# Patient Record
Sex: MALE | Race: WHITE | Employment: FULL TIME | URBAN - METROPOLITAN AREA
[De-identification: names, ages, dates, MRNs, and addresses within clinical notes are randomized per-mention and may not be internally consistent; named-entity substitution may affect disease eponyms.]

---

## 2018-11-14 ENCOUNTER — HOSPITAL ENCOUNTER (OUTPATIENT)
Dept: GENERAL RADIOLOGY | Age: 53
Discharge: HOME | End: 2018-11-14
Payer: COMMERCIAL

## 2018-11-14 ENCOUNTER — HOSPITAL ENCOUNTER (OUTPATIENT)
Age: 53
Discharge: HOME | End: 2018-11-14
Payer: COMMERCIAL

## 2018-11-14 DIAGNOSIS — M25.552 LEFT HIP PAIN: ICD-10-CM

## 2018-11-14 PROCEDURE — 73501 X-RAY EXAM HIP UNI 1 VIEW: CPT

## 2021-03-08 ENCOUNTER — HOSPITAL ENCOUNTER (OUTPATIENT)
Age: 56
Discharge: HOME | End: 2021-03-08
Payer: COMMERCIAL

## 2021-03-08 ENCOUNTER — HOSPITAL ENCOUNTER (OUTPATIENT)
Dept: GENERAL RADIOLOGY | Age: 56
Discharge: HOME | End: 2021-03-08
Payer: COMMERCIAL

## 2021-03-08 DIAGNOSIS — M25.552 LEFT HIP PAIN: ICD-10-CM

## 2021-03-08 PROCEDURE — 73502 X-RAY EXAM HIP UNI 2-3 VIEWS: CPT

## 2021-06-07 ENCOUNTER — OFFICE VISIT (OUTPATIENT)
Dept: ORTHOPEDIC SURGERY | Age: 56
End: 2021-06-07
Payer: COMMERCIAL

## 2021-06-07 VITALS
WEIGHT: 206 LBS | HEIGHT: 71 IN | HEART RATE: 77 BPM | OXYGEN SATURATION: 98 % | RESPIRATION RATE: 15 BRPM | BODY MASS INDEX: 28.84 KG/M2

## 2021-06-07 DIAGNOSIS — M16.12 PRIMARY OSTEOARTHRITIS OF LEFT HIP: Primary | ICD-10-CM

## 2021-06-07 PROCEDURE — 4004F PT TOBACCO SCREEN RCVD TLK: CPT | Performed by: ORTHOPAEDIC SURGERY

## 2021-06-07 PROCEDURE — G8427 DOCREV CUR MEDS BY ELIG CLIN: HCPCS | Performed by: ORTHOPAEDIC SURGERY

## 2021-06-07 PROCEDURE — G8419 CALC BMI OUT NRM PARAM NOF/U: HCPCS | Performed by: ORTHOPAEDIC SURGERY

## 2021-06-07 PROCEDURE — 3017F COLORECTAL CA SCREEN DOC REV: CPT | Performed by: ORTHOPAEDIC SURGERY

## 2021-06-07 PROCEDURE — 99203 OFFICE O/P NEW LOW 30 MIN: CPT | Performed by: ORTHOPAEDIC SURGERY

## 2021-06-07 RX ORDER — IBUPROFEN 200 MG
200 TABLET ORAL EVERY 6 HOURS PRN
COMMUNITY

## 2021-06-07 RX ORDER — ATORVASTATIN CALCIUM 20 MG/1
TABLET, FILM COATED ORAL
COMMUNITY
Start: 2021-05-15

## 2021-06-07 RX ORDER — LEVOTHYROXINE SODIUM 75 UG/1
TABLET ORAL
COMMUNITY
Start: 2021-05-15 | End: 2021-08-25

## 2021-06-07 RX ORDER — HYDROGEN PEROXIDE 2.65 ML/100ML
LIQUID ORAL; TOPICAL
COMMUNITY
Start: 2021-03-08 | End: 2021-10-18

## 2021-06-07 ASSESSMENT — ENCOUNTER SYMPTOMS
CHEST TIGHTNESS: 0
COLOR CHANGE: 0
BACK PAIN: 0

## 2021-06-07 NOTE — PROGRESS NOTES
Patient presents to the office today for left hip pain. Pt states pain today in the left hip is a 0/10 but at night pain is a 10/10 pt states pain has been going on for about 3 years. Pt states that most of his pain is in his left groin area. Pt states he is having a difficult time crossing his legs or going up and down the stairs. Pt states he does take advil daily to help with the pain for at least 8 hours. Pt states he has a difficult time sleeping at night do to the pain.     X-ray of the pelvis completed on 3/8/21  Impression   Moderate to severe osteoarthritis, slightly progressed since 2018.        
present. No deformity. Cervical back: Normal range of motion. Right hip: No deformity, lacerations, tenderness, bony tenderness or crepitus. Decreased range of motion. Normal strength. Left hip: Tenderness, bony tenderness and crepitus present. No deformity or lacerations. Decreased range of motion. Normal strength. Right knee: Normal.      Left knee: Normal.   Skin:     General: Skin is warm and dry. Capillary Refill: Capillary refill takes less than 2 seconds. Coloration: Skin is not pale. Findings: No erythema or rash. Neurological:      Mental Status: He is alert and oriented to person, place, and time. Left hip-Skin intact with no erythema, ecchymosis or lacerations present. Moderate pain in the left groin with range of motion. Assessment:      Left hip DJD, severe      Plan:      I discussed with him today his x-ray findings. I explained to him that does have severe arthritis in the left hip. At this point given his persistent and worsening symptoms and with his x-ray findings I recommend surgical treatment. I had a lengthy discussion with him today in regards to left total hip replacement surgery. I explained risks, benefits, possible complications of the procedure and answered all questions for the patient. I explained postoperative rehabilitation protocol and expectations with the patient today. The patient understands and consents to the procedure. Patient will follow up with their primary care physician prior to surgical treatment for preoperative clearance. We will schedule surgery at soonest convenience. Continue weight-bearing as tolerated. Continue range of motion exercises as instructed. Ice and elevate as needed. Tylenol or Motrin for pain. Follow up in 3 weeks postop. He would like to do his surgery as an outpatient at [facility].         Mariluz Vargas, DO

## 2021-06-07 NOTE — PATIENT INSTRUCTIONS
Continue weight-bearing as tolerated. Continue range of motion exercises as instructed. Ice and elevate as needed. Tylenol or Motrin for pain. We will schedule surgery at soonest convenience. If you have any questions regarding your surgery please call our office and ask to speak with Heidi.  [phone]

## 2021-08-02 DIAGNOSIS — M16.12 PRIMARY OSTEOARTHRITIS OF LEFT HIP: Primary | ICD-10-CM

## 2021-08-23 RX ORDER — SODIUM CHLORIDE, SODIUM LACTATE, POTASSIUM CHLORIDE, CALCIUM CHLORIDE 600; 310; 30; 20 MG/100ML; MG/100ML; MG/100ML; MG/100ML
INJECTION, SOLUTION INTRAVENOUS CONTINUOUS
Status: CANCELLED | OUTPATIENT
Start: 2021-08-31

## 2021-08-25 ENCOUNTER — TELEPHONE (OUTPATIENT)
Dept: ORTHOPEDIC SURGERY | Age: 56
End: 2021-08-25

## 2021-08-25 ENCOUNTER — HOSPITAL ENCOUNTER (OUTPATIENT)
Dept: PHYSICAL THERAPY | Age: 56
Setting detail: THERAPIES SERIES
Discharge: HOME | End: 2021-08-25
Payer: COMMERCIAL

## 2021-08-25 ENCOUNTER — HOSPITAL ENCOUNTER (OUTPATIENT)
Dept: PREADMISSION TESTING | Age: 56
Discharge: HOME | End: 2021-08-29
Payer: COMMERCIAL

## 2021-08-25 ENCOUNTER — HOSPITAL ENCOUNTER (OUTPATIENT)
Age: 56
Discharge: HOME | End: 2021-08-25
Payer: COMMERCIAL

## 2021-08-25 ENCOUNTER — TELEPHONE (OUTPATIENT)
Dept: CARDIOLOGY CLINIC | Age: 56
End: 2021-08-25

## 2021-08-25 ENCOUNTER — HOSPITAL ENCOUNTER (OUTPATIENT)
Dept: LAB | Age: 56
Discharge: HOME | End: 2021-08-25
Payer: COMMERCIAL

## 2021-08-25 ENCOUNTER — ANESTHESIA EVENT (OUTPATIENT)
Dept: OPERATING ROOM | Age: 56
End: 2021-08-25
Payer: COMMERCIAL

## 2021-08-25 VITALS
OXYGEN SATURATION: 97 % | WEIGHT: 206 LBS | BODY MASS INDEX: 28.84 KG/M2 | TEMPERATURE: 96.7 F | HEIGHT: 71 IN | RESPIRATION RATE: 20 BRPM | HEART RATE: 64 BPM | SYSTOLIC BLOOD PRESSURE: 131 MMHG | DIASTOLIC BLOOD PRESSURE: 68 MMHG

## 2021-08-25 DIAGNOSIS — Z01.818 PRE-OP EXAMINATION: Primary | ICD-10-CM

## 2021-08-25 DIAGNOSIS — M16.12 PRIMARY OSTEOARTHRITIS OF LEFT HIP: ICD-10-CM

## 2021-08-25 LAB
ALBUMIN SERPL-MCNC: 4.7 GM/DL (ref 3.4–5)
ALP BLD-CCNC: 108 IU/L (ref 40–128)
ALT SERPL-CCNC: 7 U/L (ref 10–40)
ANION GAP SERPL CALCULATED.3IONS-SCNC: 11 MMOL/L (ref 4–16)
AST SERPL-CCNC: <5 IU/L (ref 15–37)
BACTERIA: NEGATIVE /HPF
BASOPHILS ABSOLUTE: 0.1 K/CU MM
BASOPHILS RELATIVE PERCENT: 0.7 % (ref 0–1)
BILIRUB SERPL-MCNC: 0.3 MG/DL (ref 0–1)
BILIRUBIN URINE: NEGATIVE MG/DL
BLOOD, URINE: NEGATIVE
BUN BLDV-MCNC: 17 MG/DL (ref 6–23)
CALCIUM SERPL-MCNC: 9.9 MG/DL (ref 8.3–10.6)
CHLORIDE BLD-SCNC: 105 MMOL/L (ref 99–110)
CLARITY: CLEAR
CO2: 24 MMOL/L (ref 21–32)
COLOR: YELLOW
CREAT SERPL-MCNC: 0.5 MG/DL (ref 0.9–1.3)
DIFFERENTIAL TYPE: ABNORMAL
EKG ATRIAL RATE: 66 BPM
EKG DIAGNOSIS: NORMAL
EKG P AXIS: 50 DEGREES
EKG P-R INTERVAL: 160 MS
EKG Q-T INTERVAL: 388 MS
EKG QRS DURATION: 94 MS
EKG QTC CALCULATION (BAZETT): 406 MS
EKG R AXIS: 57 DEGREES
EKG T AXIS: 39 DEGREES
EKG VENTRICULAR RATE: 66 BPM
EOSINOPHILS ABSOLUTE: 0.2 K/CU MM
EOSINOPHILS RELATIVE PERCENT: 2.8 % (ref 0–3)
ERYTHROCYTE SEDIMENTATION RATE: 11 MM/HR (ref 0–20)
GFR AFRICAN AMERICAN: >60 ML/MIN/1.73M2
GFR NON-AFRICAN AMERICAN: >60 ML/MIN/1.73M2
GLUCOSE BLD-MCNC: 87 MG/DL (ref 70–99)
GLUCOSE, URINE: NEGATIVE MG/DL
HCT VFR BLD CALC: 46.1 % (ref 42–52)
HEMOGLOBIN: 15.3 GM/DL (ref 13.5–18)
IMMATURE NEUTROPHIL %: 0.3 % (ref 0–0.43)
KETONES, URINE: NEGATIVE MG/DL
LEUKOCYTE ESTERASE, URINE: NEGATIVE
LYMPHOCYTES ABSOLUTE: 2.7 K/CU MM
LYMPHOCYTES RELATIVE PERCENT: 35.5 % (ref 24–44)
MCH RBC QN AUTO: 31.2 PG (ref 27–31)
MCHC RBC AUTO-ENTMCNC: 33.2 % (ref 32–36)
MCV RBC AUTO: 94.1 FL (ref 78–100)
MONOCYTES ABSOLUTE: 0.6 K/CU MM
MONOCYTES RELATIVE PERCENT: 8.3 % (ref 0–4)
MUCUS: ABNORMAL HPF
NITRITE URINE, QUANTITATIVE: NEGATIVE
NUCLEATED RBC %: 0 %
PDW BLD-RTO: 11.9 % (ref 11.7–14.9)
PH, URINE: 5 (ref 5–8)
PLATELET # BLD: 191 K/CU MM (ref 140–440)
PMV BLD AUTO: 10.3 FL (ref 7.5–11.1)
POTASSIUM SERPL-SCNC: 4.6 MMOL/L (ref 3.5–5.1)
PROTEIN UA: NEGATIVE MG/DL
RBC # BLD: 4.9 M/CU MM (ref 4.6–6.2)
RBC URINE: <1 /HPF (ref 0–3)
SEGMENTED NEUTROPHILS ABSOLUTE COUNT: 3.9 K/CU MM
SEGMENTED NEUTROPHILS RELATIVE PERCENT: 52.4 % (ref 36–66)
SODIUM BLD-SCNC: 140 MMOL/L (ref 135–145)
SPECIFIC GRAVITY UA: 1.01 (ref 1–1.03)
TOTAL IMMATURE NEUTOROPHIL: 0.02 K/CU MM
TOTAL NUCLEATED RBC: 0 K/CU MM
TOTAL PROTEIN: 6.9 GM/DL (ref 6.4–8.2)
TRICHOMONAS: ABNORMAL /HPF
UROBILINOGEN, URINE: NEGATIVE MG/DL (ref 0.2–1)
WBC # BLD: 7.5 K/CU MM (ref 4–10.5)
WBC UA: 2 /HPF (ref 0–2)

## 2021-08-25 PROCEDURE — 97110 THERAPEUTIC EXERCISES: CPT

## 2021-08-25 PROCEDURE — 81001 URINALYSIS AUTO W/SCOPE: CPT

## 2021-08-25 PROCEDURE — U0003 INFECTIOUS AGENT DETECTION BY NUCLEIC ACID (DNA OR RNA); SEVERE ACUTE RESPIRATORY SYNDROME CORONAVIRUS 2 (SARS-COV-2) (CORONAVIRUS DISEASE [COVID-19]), AMPLIFIED PROBE TECHNIQUE, MAKING USE OF HIGH THROUGHPUT TECHNOLOGIES AS DESCRIBED BY CMS-2020-01-R: HCPCS

## 2021-08-25 PROCEDURE — 85025 COMPLETE CBC W/AUTO DIFF WBC: CPT

## 2021-08-25 PROCEDURE — U0005 INFEC AGEN DETEC AMPLI PROBE: HCPCS

## 2021-08-25 PROCEDURE — 80053 COMPREHEN METABOLIC PANEL: CPT

## 2021-08-25 PROCEDURE — 97530 THERAPEUTIC ACTIVITIES: CPT

## 2021-08-25 PROCEDURE — 97161 PT EVAL LOW COMPLEX 20 MIN: CPT

## 2021-08-25 PROCEDURE — 93010 ELECTROCARDIOGRAM REPORT: CPT | Performed by: INTERNAL MEDICINE

## 2021-08-25 PROCEDURE — 85652 RBC SED RATE AUTOMATED: CPT

## 2021-08-25 PROCEDURE — 93005 ELECTROCARDIOGRAM TRACING: CPT | Performed by: ORTHOPAEDIC SURGERY

## 2021-08-25 PROCEDURE — 87086 URINE CULTURE/COLONY COUNT: CPT

## 2021-08-25 ASSESSMENT — PAIN SCALES - GENERAL
PAINLEVEL_OUTOF10: 0
PAINLEVEL_OUTOF10: 5

## 2021-08-25 ASSESSMENT — PAIN DESCRIPTION - DESCRIPTORS: DESCRIPTORS: ACHING;CONSTANT

## 2021-08-25 ASSESSMENT — PAIN DESCRIPTION - ONSET: ONSET: GRADUAL

## 2021-08-25 ASSESSMENT — PAIN DESCRIPTION - PAIN TYPE
TYPE: CHRONIC PAIN
TYPE: CHRONIC PAIN

## 2021-08-25 ASSESSMENT — PAIN DESCRIPTION - FREQUENCY
FREQUENCY: CONTINUOUS
FREQUENCY: INTERMITTENT

## 2021-08-25 ASSESSMENT — PAIN DESCRIPTION - PROGRESSION: CLINICAL_PROGRESSION: GRADUALLY WORSENING

## 2021-08-25 ASSESSMENT — PAIN DESCRIPTION - ORIENTATION
ORIENTATION: LEFT;OUTER
ORIENTATION: LEFT

## 2021-08-25 ASSESSMENT — PAIN DESCRIPTION - LOCATION
LOCATION: HIP
LOCATION: HIP

## 2021-08-25 ASSESSMENT — PAIN - FUNCTIONAL ASSESSMENT: PAIN_FUNCTIONAL_ASSESSMENT: PREVENTS OR INTERFERES SOME ACTIVE ACTIVITIES AND ADLS

## 2021-08-25 NOTE — FLOWSHEET NOTE
"""Dr. Marj Segura to review test results and patient to be contacted.  """ Hospital scheduled him for pretesting at same time as appointment he cannot miss pretesting if cancel somewhere this afternoon would like to come in.

## 2021-08-25 NOTE — ANESTHESIA PRE PROCEDURE
Department of Anesthesiology  Preprocedure Note       Name:  Sudha Mcdermott   Age:  48 y.o.  :  1965                                          MRN:  [id number]         Date:  2021      Surgeon: Sukhwinder Hernandez):  Paulo Serna DO    Procedure: Procedure(s):  LEFT HIP TOTAL ARTHROPLASTY    Medications prior to admission:   Prior to Admission medications    Medication Sig Start Date End Date Taking? Authorizing Provider   metFORMIN (GLUCOPHAGE) 1000 MG tablet TAKE 1 TABLET BY MOUTH TWICE DAILY WITH MEALS 5/15/21   Historical Provider, MD   EQ ASPIRIN ADULT LOW DOSE 81 MG EC tablet TAKE 1 BY MOUTH ONCE DAILY 3/8/21   Historical Provider, MD   atorvastatin (LIPITOR) 20 MG tablet TAKE 1 TABLET BY MOUTH ONCE DAILY FOR 90 DAYS 5/15/21   Historical Provider, MD   EUTHYROX 75 MCG tablet TAKE 1 TABLET BY MOUTH ONCE DAILY IN THE MORNING ON AN EMPTY STOMACH 5/15/21   Historical Provider, MD   ibuprofen (ADVIL;MOTRIN) 200 MG tablet Take 200 mg by mouth every 6 hours as needed for Pain    Historical Provider, MD       Current medications:    No current facility-administered medications for this encounter. Current Outpatient Medications   Medication Sig Dispense Refill    metFORMIN (GLUCOPHAGE) 1000 MG tablet TAKE 1 TABLET BY MOUTH TWICE DAILY WITH MEALS      EQ ASPIRIN ADULT LOW DOSE 81 MG EC tablet TAKE 1 BY MOUTH ONCE DAILY      atorvastatin (LIPITOR) 20 MG tablet TAKE 1 TABLET BY MOUTH ONCE DAILY FOR 90 DAYS      EUTHYROX 75 MCG tablet TAKE 1 TABLET BY MOUTH ONCE DAILY IN THE MORNING ON AN EMPTY STOMACH      ibuprofen (ADVIL;MOTRIN) 200 MG tablet Take 200 mg by mouth every 6 hours as needed for Pain         Allergies:  No Known Allergies    Problem List:  There is no problem list on file for this patient. Past Medical History:  History reviewed. No pertinent past medical history. Past Surgical History:  History reviewed. No pertinent surgical history.     Social History:    Social History Tobacco Use    Smoking status: Not on file   Substance Use Topics    Alcohol use: Not on file                                Counseling given: Not Answered      Vital Signs (Current): There were no vitals filed for this visit. BP Readings from Last 3 Encounters:   08/25/21 131/68       NPO Status:                                                                                 BMI:   Wt Readings from Last 3 Encounters:   08/25/21 206 lb (93.4 kg)   06/07/21 206 lb (93.4 kg)     There is no height or weight on file to calculate BMI.    CBC: No results found for: WBC, RBC, HGB, HCT, MCV, RDW, PLT    CMP: No results found for: NA, K, CL, CO2, BUN, CREATININE, GFRAA, AGRATIO, LABGLOM, GLUCOSE, PROT, CALCIUM, BILITOT, ALKPHOS, AST, ALT    POC Tests: No results for input(s): POCGLU, POCNA, POCK, POCCL, POCBUN, POCHEMO, POCHCT in the last 72 hours.     Coags: No results found for: PROTIME, INR, APTT    HCG (If Applicable): No results found for: PREGTESTUR, PREGSERUM, HCG, HCGQUANT     ABGs: No results found for: PHART, PO2ART, VHK3LKF, HIP3OKO, BEART, W7WBZOHB     Type & Screen (If Applicable):  No results found for: LABABO, LABRH    Drug/Infectious Status (If Applicable):  No results found for: HIV, HEPCAB    COVID-19 Screening (If Applicable): No results found for: COVID19        Anesthesia Evaluation  Patient summary reviewed  Airway: Mallampati: I  TM distance: >3 FB   Neck ROM: full  Mouth opening: > = 3 FB Dental: normal exam         Pulmonary:Negative Pulmonary ROS                              Cardiovascular:  Exercise tolerance: good (>4 METS),   (+) valvular problems/murmurs:, hyperlipidemia      ECG reviewed  Rhythm: regular  Rate: normal           Beta Blocker:  Not on Beta Blocker      ROS comment: Sinus rhythm with premature atrial complexes in a pattern of bigeminy   Otherwise normal ECG   No previous ECGs available   Confirmed by [id number] [facility], SAYED ([id number]) on

## 2021-08-25 NOTE — PLAN OF CARE
Outpatient Physical Therapy           [place]           [] Phone: [phone]   Fax: [phone]  [place]           [] Phone: [phone]   Fax: [phone]     To: Referring Practitioner: Dr. Brandi Guan    From: Ryan Zimmerman, PT, DPT, OCS   Patient: Yonathan Abad       : 1965  Diagnosis: Diagnosis: L PATRICIO  21   Treatment Diagnosis: Treatment Diagnosis: L hip pain, weakness, stiffness, gait dysfunction   Date: 2021    Physical Therapy Certification/Re-Certification Form  Dear Dr. Brandi Guan,  The following patient has been evaluated for physical therapy services and for therapy to continue, insurance requires physician review of the treatment plan initially and every 90 days. Please review the attached evaluation and/or summary of the patient's plan of care, and verify that you agree therapy should continue by signing the attached document and sending it back to our office. Assessment:      Pt is 48year old male with expected L PATRICIO  on 21. Pt now has difficulties completing closed chain activities including stairs and sit to stand and prolonged weightbearing activities with increasing pain. Pt demo deficits this date that include pain, flexibility restrictions and weakness. Pt educated on proper gait with AD and stair negotiation. Issued handout for exercises prior and post surgery until return to outpatient PT services. Pt will benefit with PT services with strength/ROM, gait training, manual and modalities post surgery to maximize function. Pt prior to onset of current condition had min/no pain with able to complete full ADLs and work activities. Patient received education on their current pathology and how their condition effects them with their functional activities. Patient understood discussion and questions were answered. Patient understands their activity limitations and understands rational for treatment progression.     Plan of Care/Treatment to date:  [x] Therapeutic Exercise  [x] Modalities:  [x] Therapeutic Activity     [] Ultrasound  [] Electrical Stimulation  [x] Gait Training      [] Cervical Traction [] Lumbar Traction  [x] Neuromuscular Re-education    [] Cold/hotpack [] Iontophoresis   [x] Instruction in HEP      [] Vasopneumatic    [] Dry Needling  [x] Manual Therapy               [] Aquatic Therapy       Other:          Frequency/Duration:  # Days per week: [x] 1 day # Weeks: [] 1 week [] 5 weeks     [] 2 days   [x] 2 weeks [] 6 weeks     [] 3 days   [] 3 weeks [] 7 weeks     [] 4 days   [] 4 weeks [] 8 weeks         [] 9 weeks [] 10 weeks         [] 11 weeks [] 12 weeks    Rehab Potential/Progress: [] Excellent [x] Good [] Fair  [] Poor     Goals:    Patient goals : improve pain/mobility    Short term goals (All Goals MET)   Time Frame for Long term goals : In this visit, patient will  Long term goal 1: demonstrate good understanding of exercise progression post surgery. Long term goal 2: demonstrate good understanding of walker use post surgery    Long term goal 3: demonstrate good understanding of ascending/descending stairs after surgery. Long term goal 4: watch [id number] Video      Time Frame for Long term goals: Re-assess post surgery for appropriate goals. Electronically signed by:  Areli Doshi, PT, DPT, OCS  8/25/2021, 2:40 PM    8/25/2021 2:40 PM         If you have any questions or concerns, please don't hesitate to call.   Thank you for your referral.      Physician Signature:________________________________Date:_________ TIME: _____  By signing above, therapists plan is approved by physician

## 2021-08-25 NOTE — FLOWSHEET NOTE
Outpatient Physical Therapy  [place]           [x] Phone: [phone]   Fax: [phone]  [place]           [] Phone: [phone]   Fax: [phone]        Physical Therapy Daily Treatment Note  Date:  2021    Patient Name:  Antonieta Santana    :  1965  MRN: [id number]  Restrictions/Precautions:    Diagnosis:   Diagnosis: L PATRICIO     Date of Injury/Surgery:  21  Treatment Diagnosis: Treatment Diagnosis: L hip pain, weakness, stiffness, gait dysfunction    Insurance/Certification information: PT Insurance Information: [facility]   Referring Physician:  Referring Practitioner: Dr. Vic Schwab  Next Doctor Visit:    Plan of care signed (Y/N): N, sent 21   Outcome Measure: HOOS:    Visit# / total visits:  1 /2 per POC  Pain level: 5/10   Goals:     Patient goals : improve pain/mobility    Short term goals (All Goals MET)   Time Frame for Long term goals : In this visit, patient will  Long term goal 1: demonstrate good understanding of exercise progression post surgery. Long term goal 2: demonstrate good understanding of walker use post surgery    Long term goal 3: demonstrate good understanding of ascending/descending stairs after surgery. Long term goal 4: watch [id number] Video      Time Frame for Long term goals: Re-assess post surgery for appropriate goals. Summary of Evaluation:   Pt is 48year old male with expected L PATRICIO  on 21. Pt now has difficulties completing closed chain activities including stairs and sit to stand and prolonged weightbearing activities with increasing pain. Pt demo deficits this date that include pain, flexibility restrictions and weakness. Pt educated on proper gait with AD and stair negotiation. Issued handout for exercises prior and post surgery until return to outpatient PT services. Pt will benefit with PT services with strength/ROM, gait training, manual and modalities post surgery to maximize function.  Pt prior to onset of current

## 2021-08-25 NOTE — TELEPHONE ENCOUNTER
Called patient and he is upset with what he was told at the hospital to what is being said now. Dr Reese Espino also spoke with him.  He will try to get cardiac clearance prior to surgery

## 2021-08-25 NOTE — PROGRESS NOTES
Physical Therapy  Initial Assessment  Date: 2021  Patient Name: Shadi Lim  MRN: [id number]  : 1965     Treatment Diagnosis: L hip pain, weakness, stiffness, gait dysfunction    Restrictions       Subjective   General  Chart Reviewed: Yes  Patient assessed for rehabilitation services?: Yes  Referring Practitioner: Dr. Terry Vera  Diagnosis: L PATRICIO  21  PT Visit Information  PT Insurance Information: [facility]  Subjective  Subjective: 2 year progressive increase in hip. Denies incident of onset. Taking tylenol to manage the pain. X-rays with severe OA. Decision to undergo surgery. DOS: 21. Lives with wife at home but taking couple of weeks off to assist pt needs. 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 Pain Screening  Patient Currently in Pain: Yes  Pain Assessment  Pain Assessment: 0-10  Pain Level: 0 (Worst: 5/10)  Patient's Stated Pain Goal: No pain  Pain Type: Chronic pain  Pain Location: Hip  Pain Orientation: Left; Outer  Pain Descriptors:  (pain)  Pain Frequency: Intermittent  Pain Onset: Gradual  Clinical Progression: Gradually worsening  Functional Pain Assessment: Prevents or interferes some active activities and ADLs  Vital Signs  Patient Currently in Pain: Yes    Vision/Hearing  Vision  Vision: Within Functional Limits  Hearing  Hearing: Within functional limits    Orientation  Orientation  Overall Orientation Status: Within Normal Limits    Social/Functional History  Social/Functional History  Lives With: Spouse  Type of Home: House  Home Layout: One level; Two level; Able to Live on Main level with bedroom/bathroom  Home Access: Stairs to enter with rails  Entrance Stairs - Number of Steps: 2  Bathroom Shower/Tub: Tub/Shower unit  Bathroom Toilet: Standard  ADL Assistance: Independent  Homemaking Assistance: Independent  Ambulation Assistance: Independent  Transfer Assistance: Independent  Active : Yes  Mode of Transportation: Car  Occupation: Full time employment  Type of occupation:   Leisure & Hobbies: Tractor pulls    Objective     Observation/Palpation  Palpation: min lateral hip. Observation: No AD with antalgic gait. Able to stand without UE assistance with difficulty with difficulty with eccentric control. Good quad set. PROM RLE (degrees)  RLE PROM: WNL  AROM RLE (degrees)  RLE AROM: WNL  PROM LLE (degrees)  LLE General PROM: limited to neutral with IR, limited to 20 deg ER. Limited to 90 deg flexion secondary to pain. AROM LLE (degrees)  LLE AROM : WFL  LLE General AROM: Limited hip ER/IR secondary to stiffness and pain with functioinal hip flex/ABD/ADD with increase in pain. Joint Mobility  ROM RLE: normal patellar mobility without increase in pain. Strength RLE  Strength RLE: WNL  Comment: 5/5 in all directions. Strength LLE  Strength LLE: Exception  Comment: Completed within available motion. Min increase in pain with testing of hip flex/ABD/ADD/ER/IR  L Hip Flexion: 4/5  L Hip Extension: 4+/5  L Hip ABduction: 4/5  L Hip ADduction: 4/5  L Hip Internal Rotation: 4/5  L Hip External Rotation: 4+/5  L Knee Flexion: 5/5  L Knee Extension: 5/5  Strength Other  Other: TU.83 sec without AD. 5x sit to stand: 14.2 sec without UE assist.     Additional Measures  Other: HOOS:  disability        Balance  Single Leg Stance R Le  Single Leg Stance L Le  Comments: Min increase in pain with L SLS. Assessment   Conditions Requiring Skilled Therapeutic Intervention  Body structures, Functions, Activity limitations: Decreased functional mobility ; Decreased ROM; Decreased strength;Decreased endurance   Pt is 48year old male with expected L PATRICIO  on 21. Pt now has difficulties completing closed chain activities including stairs and sit to stand and prolonged weightbearing activities with increasing pain. Pt demo deficits this date that include pain, flexibility restrictions and weakness.   Pt educated on proper gait with AD and stair negotiation. Issued handout for exercises prior and post surgery until return to outpatient PT services. Pt will benefit with PT services with strength/ROM, gait training, manual and modalities post surgery to maximize function. Pt prior to onset of current condition had min/no pain with able to complete full ADLs and work activities. Patient received education on their current pathology and how their condition effects them with their functional activities. Patient understood discussion and questions were answered. Patient understands their activity limitations and understands rational for treatment progression. Treatment Diagnosis: L hip pain, weakness, stiffness, gait dysfunction  Prognosis: Good  Decision Making: Low Complexity  Barriers to Learning: None  REQUIRES PT FOLLOW UP: Yes  Activity Tolerance  Activity Tolerance: Patient Tolerated treatment well;Patient limited by fatigue;Patient limited by pain; Patient limited by endurance         Plan   Plan  Times per week: 1  Plan weeks: 2  Specific instructions for Next Treatment: return for Re-eval with progression functionally as tolerated. Current Treatment Recommendations: Strengthening, ROM, Neuromuscular Re-education, Home Exercise Program, Manual Therapy - Soft Tissue Mobilization, Modalities, Gait Training, Balance Training    Goals  Short term goals (All Goals MET)   Time Frame for Long term goals : In this visit, patient will  Long term goal 1: demonstrate good understanding of exercise progression post surgery. Long term goal 2: demonstrate good understanding of walker use post surgery    Long term goal 3: demonstrate good understanding of ascending/descending stairs after surgery. Long term goal 4: watch [id number] Video      Time Frame for Long term goals: Re-assess post surgery for appropriate goals.   Patient Goals   Patient goals : improve pain/mobility         Carl Lis, PT, DPT, OCS    8/25/2021 2:35 PM

## 2021-08-25 NOTE — TELEPHONE ENCOUNTER
Pt called into the office today wanting to speak to you about his surgery. Pt states that he had received a call from cardiology concerning an issue they had found and is concerned about surgery. Pt states that he feels fine and would like to speak to you as soon as possible in regards to his situation. Please Advise.      Phone: [phone]

## 2021-08-25 NOTE — TELEPHONE ENCOUNTER
Spoke with patient advised that Dr. Angela Edwards office is asking for him to have Cardiac Clearance      . [1:38 PM] Gerardo Pretty      hi,  this patient needs cardiac clearance this week per Dr Felicity Chavez  he is having a Total Hip replacement on 8/31/21 [id number]  ? [1:39 PM] Gerardo Pretty      referral is in epic  ? [1:39 PM] Gerardo Pretty      he can see anyone that is available  thank you    Patient is calling Dr Angela Edwards office and will call me back.

## 2021-08-26 ENCOUNTER — TELEPHONE (OUTPATIENT)
Dept: ORTHOPEDIC SURGERY | Age: 56
End: 2021-08-26

## 2021-08-26 LAB
SARS-COV-2: NOT DETECTED
SOURCE: NORMAL

## 2021-08-26 NOTE — TELEPHONE ENCOUNTER
Left message for patient to call the office and go over his surgery plan.    I gave him the options of either 9/14 in [facility] or 9/16 in [facility]

## 2021-08-26 NOTE — TELEPHONE ENCOUNTER
Patient has an appointment next Thursday 9/2/21. He wants to have surgery asap. The next available date Dr Kyara Fox has for [address] is 10/12 or can we put him on for 9/21?    Please advise

## 2021-08-27 ENCOUNTER — TELEPHONE (OUTPATIENT)
Dept: CARDIOLOGY CLINIC | Age: 56
End: 2021-08-27

## 2021-08-27 LAB
CULTURE: NORMAL
Lab: NORMAL
SPECIMEN: NORMAL

## 2021-08-30 NOTE — PROGRESS NOTES
I left a message on patients listed number for date of surgery 8/31/21 at 0730 with arrival time of 0600. Nothing to eat after midnight and reinforced. I left the call back number to PAT office for call back with questions.

## 2021-08-31 ENCOUNTER — ANESTHESIA (OUTPATIENT)
Dept: OPERATING ROOM | Age: 56
End: 2021-08-31
Payer: COMMERCIAL

## 2021-08-31 ENCOUNTER — HOSPITAL ENCOUNTER (OUTPATIENT)
Age: 56
Setting detail: OBSERVATION
Discharge: HOME | End: 2021-09-01
Attending: ORTHOPAEDIC SURGERY | Admitting: ORTHOPAEDIC SURGERY
Payer: COMMERCIAL

## 2021-08-31 ENCOUNTER — APPOINTMENT (OUTPATIENT)
Dept: GENERAL RADIOLOGY | Age: 56
End: 2021-08-31
Attending: ORTHOPAEDIC SURGERY
Payer: COMMERCIAL

## 2021-08-31 VITALS — DIASTOLIC BLOOD PRESSURE: 67 MMHG | OXYGEN SATURATION: 99 % | SYSTOLIC BLOOD PRESSURE: 109 MMHG

## 2021-08-31 DIAGNOSIS — Z96.642 STATUS POST LEFT HIP REPLACEMENT: Primary | ICD-10-CM

## 2021-08-31 LAB
BASE EXCESS: 1 (ref 0–3.3)
BASE EXCESS: 2 (ref 0–3.3)
CARBON MONOXIDE, BLOOD: 2.5 % (ref 0–5)
CARBON MONOXIDE, BLOOD: 2.8 % (ref 0–5)
CO2 CONTENT: 23.6 MMOL/L (ref 19–24)
CO2 CONTENT: 26.7 MMOL/L (ref 19–24)
COMMENT: ABNORMAL
COMMENT: ABNORMAL
EKG ATRIAL RATE: 43 BPM
EKG DIAGNOSIS: NORMAL
EKG P AXIS: 45 DEGREES
EKG P-R INTERVAL: 146 MS
EKG Q-T INTERVAL: 430 MS
EKG QRS DURATION: 90 MS
EKG QTC CALCULATION (BAZETT): 363 MS
EKG R AXIS: 67 DEGREES
EKG T AXIS: 51 DEGREES
EKG VENTRICULAR RATE: 43 BPM
GLUCOSE BLD-MCNC: 133 MG/DL (ref 70–99)
GLUCOSE BLD-MCNC: 149 MG/DL (ref 70–99)
GLUCOSE BLD-MCNC: 163 MG/DL (ref 70–99)
HCO3 ARTERIAL: 22.6 MMOL/L (ref 18–23)
HCO3 ARTERIAL: 25.2 MMOL/L (ref 18–23)
MAGNESIUM: 1.8 MG/DL (ref 1.8–2.4)
METHEMOGLOBIN ARTERIAL: 1.1 %
METHEMOGLOBIN ARTERIAL: 1.1 %
O2 SATURATION: 68.1 % (ref 96–97)
O2 SATURATION: 94.2 % (ref 96–97)
PCO2 ARTERIAL: 34 MMHG (ref 32–45)
PCO2 ARTERIAL: 50 MMHG (ref 32–45)
PH BLOOD: 7.31 (ref 7.34–7.45)
PH BLOOD: 7.43 (ref 7.34–7.45)
PO2 ARTERIAL: 36 MMHG (ref 75–100)
PO2 ARTERIAL: 76 MMHG (ref 75–100)
TSH HIGH SENSITIVITY: 3.44 UIU/ML (ref 0.27–4.2)

## 2021-08-31 PROCEDURE — 27130 TOTAL HIP ARTHROPLASTY: CPT | Performed by: ORTHOPAEDIC SURGERY

## 2021-08-31 PROCEDURE — 6360000002 HC RX W HCPCS: Performed by: ANESTHESIOLOGY

## 2021-08-31 PROCEDURE — 3600000015 HC SURGERY LEVEL 5 ADDTL 15MIN: Performed by: ORTHOPAEDIC SURGERY

## 2021-08-31 PROCEDURE — 7100000000 HC PACU RECOVERY - FIRST 15 MIN: Performed by: ORTHOPAEDIC SURGERY

## 2021-08-31 PROCEDURE — 6360000002 HC RX W HCPCS: Performed by: INTERNAL MEDICINE

## 2021-08-31 PROCEDURE — 6360000002 HC RX W HCPCS: Performed by: ORTHOPAEDIC SURGERY

## 2021-08-31 PROCEDURE — 27130 TOTAL HIP ARTHROPLASTY: CPT | Performed by: PHYSICIAN ASSISTANT

## 2021-08-31 PROCEDURE — C1776 JOINT DEVICE (IMPLANTABLE): HCPCS | Performed by: ORTHOPAEDIC SURGERY

## 2021-08-31 PROCEDURE — 2580000003 HC RX 258: Performed by: ORTHOPAEDIC SURGERY

## 2021-08-31 PROCEDURE — 6360000002 HC RX W HCPCS: Performed by: NURSE ANESTHETIST, CERTIFIED REGISTERED

## 2021-08-31 PROCEDURE — 93010 ELECTROCARDIOGRAM REPORT: CPT | Performed by: INTERNAL MEDICINE

## 2021-08-31 PROCEDURE — 94640 AIRWAY INHALATION TREATMENT: CPT

## 2021-08-31 PROCEDURE — 97162 PT EVAL MOD COMPLEX 30 MIN: CPT

## 2021-08-31 PROCEDURE — 2709999900 HC NON-CHARGEABLE SUPPLY: Performed by: ORTHOPAEDIC SURGERY

## 2021-08-31 PROCEDURE — 6370000000 HC RX 637 (ALT 250 FOR IP): Performed by: ORTHOPAEDIC SURGERY

## 2021-08-31 PROCEDURE — 97166 OT EVAL MOD COMPLEX 45 MIN: CPT

## 2021-08-31 PROCEDURE — 82803 BLOOD GASES ANY COMBINATION: CPT

## 2021-08-31 PROCEDURE — 97116 GAIT TRAINING THERAPY: CPT

## 2021-08-31 PROCEDURE — 3700000000 HC ANESTHESIA ATTENDED CARE: Performed by: ORTHOPAEDIC SURGERY

## 2021-08-31 PROCEDURE — 94761 N-INVAS EAR/PLS OXIMETRY MLT: CPT

## 2021-08-31 PROCEDURE — 93005 ELECTROCARDIOGRAM TRACING: CPT | Performed by: ANESTHESIOLOGY

## 2021-08-31 PROCEDURE — 3600000005 HC SURGERY LEVEL 5 BASE: Performed by: ORTHOPAEDIC SURGERY

## 2021-08-31 PROCEDURE — 82962 GLUCOSE BLOOD TEST: CPT

## 2021-08-31 PROCEDURE — 36600 WITHDRAWAL OF ARTERIAL BLOOD: CPT

## 2021-08-31 PROCEDURE — G0378 HOSPITAL OBSERVATION PER HR: HCPCS

## 2021-08-31 PROCEDURE — 73501 X-RAY EXAM HIP UNI 1 VIEW: CPT

## 2021-08-31 PROCEDURE — 7100000001 HC PACU RECOVERY - ADDTL 15 MIN: Performed by: ORTHOPAEDIC SURGERY

## 2021-08-31 PROCEDURE — 76942 ECHO GUIDE FOR BIOPSY: CPT | Performed by: ANESTHESIOLOGY

## 2021-08-31 PROCEDURE — 97530 THERAPEUTIC ACTIVITIES: CPT

## 2021-08-31 PROCEDURE — 3700000001 HC ADD 15 MINUTES (ANESTHESIA): Performed by: ORTHOPAEDIC SURGERY

## 2021-08-31 PROCEDURE — 83735 ASSAY OF MAGNESIUM: CPT

## 2021-08-31 PROCEDURE — 2500000003 HC RX 250 WO HCPCS: Performed by: ORTHOPAEDIC SURGERY

## 2021-08-31 PROCEDURE — 84443 ASSAY THYROID STIM HORMONE: CPT

## 2021-08-31 RX ORDER — ATORVASTATIN CALCIUM 20 MG/1
20 TABLET, FILM COATED ORAL DAILY
Status: DISCONTINUED | OUTPATIENT
Start: 2021-09-01 | End: 2021-09-01 | Stop reason: HOSPADM

## 2021-08-31 RX ORDER — KETOROLAC TROMETHAMINE 30 MG/ML
15 INJECTION, SOLUTION INTRAMUSCULAR; INTRAVENOUS EVERY 6 HOURS PRN
Status: DISCONTINUED | OUTPATIENT
Start: 2021-08-31 | End: 2021-09-01 | Stop reason: HOSPADM

## 2021-08-31 RX ORDER — CEFAZOLIN SODIUM 2 G/100ML
2000 INJECTION, SOLUTION INTRAVENOUS
Status: COMPLETED | OUTPATIENT
Start: 2021-08-31 | End: 2021-08-31

## 2021-08-31 RX ORDER — ROPIVACAINE HYDROCHLORIDE 5 MG/ML
INJECTION, SOLUTION EPIDURAL; INFILTRATION; PERINEURAL
Status: COMPLETED | OUTPATIENT
Start: 2021-08-31 | End: 2021-08-31

## 2021-08-31 RX ORDER — DEXTROSE MONOHYDRATE 50 MG/ML
100 INJECTION, SOLUTION INTRAVENOUS PRN
Status: DISCONTINUED | OUTPATIENT
Start: 2021-08-31 | End: 2021-09-01 | Stop reason: HOSPADM

## 2021-08-31 RX ORDER — SODIUM CHLORIDE 0.9 % (FLUSH) 0.9 %
10 SYRINGE (ML) INJECTION PRN
Status: DISCONTINUED | OUTPATIENT
Start: 2021-08-31 | End: 2021-09-01 | Stop reason: HOSPADM

## 2021-08-31 RX ORDER — ASPIRIN 81 MG/1
81 TABLET ORAL DAILY
Status: DISCONTINUED | OUTPATIENT
Start: 2021-09-01 | End: 2021-09-01 | Stop reason: HOSPADM

## 2021-08-31 RX ORDER — SENNA AND DOCUSATE SODIUM 50; 8.6 MG/1; MG/1
1 TABLET, FILM COATED ORAL 2 TIMES DAILY
Status: DISCONTINUED | OUTPATIENT
Start: 2021-08-31 | End: 2021-09-01 | Stop reason: HOSPADM

## 2021-08-31 RX ORDER — SODIUM CHLORIDE 9 MG/ML
25 INJECTION, SOLUTION INTRAVENOUS PRN
Status: DISCONTINUED | OUTPATIENT
Start: 2021-08-31 | End: 2021-08-31 | Stop reason: SDUPTHER

## 2021-08-31 RX ORDER — RIVAROXABAN 10 MG/1
10 TABLET, FILM COATED ORAL
Qty: 11 TABLET | Refills: 0 | Status: SHIPPED | OUTPATIENT
Start: 2021-08-31 | End: 2021-10-18

## 2021-08-31 RX ORDER — ACETAMINOPHEN 325 MG/1
650 TABLET ORAL EVERY 6 HOURS
Status: DISCONTINUED | OUTPATIENT
Start: 2021-08-31 | End: 2021-08-31 | Stop reason: SDUPTHER

## 2021-08-31 RX ORDER — PROPOFOL 10 MG/ML
INJECTION, EMULSION INTRAVENOUS CONTINUOUS PRN
Status: DISCONTINUED | OUTPATIENT
Start: 2021-08-31 | End: 2021-08-31 | Stop reason: SDUPTHER

## 2021-08-31 RX ORDER — CEFAZOLIN SODIUM 2 G/100ML
2000 INJECTION, SOLUTION INTRAVENOUS EVERY 8 HOURS
Status: DISCONTINUED | OUTPATIENT
Start: 2021-08-31 | End: 2021-08-31 | Stop reason: SDUPTHER

## 2021-08-31 RX ORDER — DEXTROSE MONOHYDRATE 25 G/50ML
12.5 INJECTION, SOLUTION INTRAVENOUS PRN
Status: DISCONTINUED | OUTPATIENT
Start: 2021-08-31 | End: 2021-09-01 | Stop reason: HOSPADM

## 2021-08-31 RX ORDER — OXYCODONE HYDROCHLORIDE 5 MG/1
5 TABLET ORAL EVERY 4 HOURS PRN
Status: DISCONTINUED | OUTPATIENT
Start: 2021-08-31 | End: 2021-09-01 | Stop reason: HOSPADM

## 2021-08-31 RX ORDER — SODIUM CHLORIDE, SODIUM LACTATE, POTASSIUM CHLORIDE, CALCIUM CHLORIDE 600; 310; 30; 20 MG/100ML; MG/100ML; MG/100ML; MG/100ML
INJECTION, SOLUTION INTRAVENOUS CONTINUOUS
Status: DISCONTINUED | OUTPATIENT
Start: 2021-08-31 | End: 2021-09-01 | Stop reason: HOSPADM

## 2021-08-31 RX ORDER — PROMETHAZINE HYDROCHLORIDE 25 MG/ML
6.25 INJECTION, SOLUTION INTRAMUSCULAR; INTRAVENOUS
Status: COMPLETED | OUTPATIENT
Start: 2021-08-31 | End: 2021-08-31

## 2021-08-31 RX ORDER — LABETALOL HYDROCHLORIDE 5 MG/ML
5 INJECTION, SOLUTION INTRAVENOUS EVERY 10 MIN PRN
Status: DISCONTINUED | OUTPATIENT
Start: 2021-08-31 | End: 2021-08-31 | Stop reason: HOSPADM

## 2021-08-31 RX ORDER — SODIUM CHLORIDE 0.9 % (FLUSH) 0.9 %
10 SYRINGE (ML) INJECTION EVERY 12 HOURS SCHEDULED
Status: DISCONTINUED | OUTPATIENT
Start: 2021-08-31 | End: 2021-08-31 | Stop reason: SDUPTHER

## 2021-08-31 RX ORDER — PROMETHAZINE HYDROCHLORIDE 25 MG/1
12.5 TABLET ORAL EVERY 6 HOURS PRN
Status: DISCONTINUED | OUTPATIENT
Start: 2021-08-31 | End: 2021-09-01 | Stop reason: HOSPADM

## 2021-08-31 RX ORDER — SODIUM CHLORIDE, SODIUM LACTATE, POTASSIUM CHLORIDE, CALCIUM CHLORIDE 600; 310; 30; 20 MG/100ML; MG/100ML; MG/100ML; MG/100ML
INJECTION, SOLUTION INTRAVENOUS CONTINUOUS
Status: DISCONTINUED | OUTPATIENT
Start: 2021-08-31 | End: 2021-08-31

## 2021-08-31 RX ORDER — HYDROMORPHONE HCL 110MG/55ML
0.25 PATIENT CONTROLLED ANALGESIA SYRINGE INTRAVENOUS EVERY 5 MIN PRN
Status: DISCONTINUED | OUTPATIENT
Start: 2021-08-31 | End: 2021-08-31

## 2021-08-31 RX ORDER — MORPHINE SULFATE 2 MG/ML
2 INJECTION, SOLUTION INTRAMUSCULAR; INTRAVENOUS EVERY 5 MIN PRN
Status: DISCONTINUED | OUTPATIENT
Start: 2021-08-31 | End: 2021-08-31 | Stop reason: HOSPADM

## 2021-08-31 RX ORDER — ATROPINE SULFATE 0.1 MG/ML
0.25 INJECTION INTRAVENOUS ONCE
Status: COMPLETED | OUTPATIENT
Start: 2021-08-31 | End: 2021-08-31

## 2021-08-31 RX ORDER — ONDANSETRON 2 MG/ML
4 INJECTION INTRAMUSCULAR; INTRAVENOUS EVERY 6 HOURS PRN
Status: DISCONTINUED | OUTPATIENT
Start: 2021-08-31 | End: 2021-08-31 | Stop reason: SDUPTHER

## 2021-08-31 RX ORDER — CEPHALEXIN 250 MG/1
250 CAPSULE ORAL 4 TIMES DAILY
Qty: 4 CAPSULE | Refills: 0 | Status: SHIPPED | OUTPATIENT
Start: 2021-08-31 | End: 2021-09-01

## 2021-08-31 RX ORDER — SODIUM CHLORIDE 0.9 % (FLUSH) 0.9 %
10 SYRINGE (ML) INJECTION PRN
Status: DISCONTINUED | OUTPATIENT
Start: 2021-08-31 | End: 2021-08-31 | Stop reason: HOSPADM

## 2021-08-31 RX ORDER — OXYCODONE HYDROCHLORIDE AND ACETAMINOPHEN 5; 325 MG/1; MG/1
1 TABLET ORAL EVERY 6 HOURS PRN
Qty: 28 TABLET | Refills: 0 | Status: SHIPPED | OUTPATIENT
Start: 2021-08-31 | End: 2021-09-07

## 2021-08-31 RX ORDER — SODIUM CHLORIDE 0.9 % (FLUSH) 0.9 %
10 SYRINGE (ML) INJECTION EVERY 12 HOURS SCHEDULED
Status: DISCONTINUED | OUTPATIENT
Start: 2021-08-31 | End: 2021-09-01 | Stop reason: HOSPADM

## 2021-08-31 RX ORDER — CHLOROPROCAINE HYDROCHLORIDE 30 MG/ML
INJECTION, SOLUTION EPIDURAL; INFILTRATION; INTRACAUDAL; PERINEURAL PRN
Status: DISCONTINUED | OUTPATIENT
Start: 2021-08-31 | End: 2021-08-31 | Stop reason: SDUPTHER

## 2021-08-31 RX ORDER — OXYCODONE HYDROCHLORIDE 5 MG/1
10 TABLET ORAL EVERY 4 HOURS PRN
Status: DISCONTINUED | OUTPATIENT
Start: 2021-08-31 | End: 2021-09-01 | Stop reason: HOSPADM

## 2021-08-31 RX ORDER — SODIUM CHLORIDE 0.9 % (FLUSH) 0.9 %
10 SYRINGE (ML) INJECTION PRN
Status: DISCONTINUED | OUTPATIENT
Start: 2021-08-31 | End: 2021-08-31 | Stop reason: SDUPTHER

## 2021-08-31 RX ORDER — SODIUM CHLORIDE 9 MG/ML
25 INJECTION, SOLUTION INTRAVENOUS PRN
Status: DISCONTINUED | OUTPATIENT
Start: 2021-08-31 | End: 2021-08-31 | Stop reason: HOSPADM

## 2021-08-31 RX ORDER — MIDAZOLAM HYDROCHLORIDE 1 MG/ML
INJECTION INTRAMUSCULAR; INTRAVENOUS PRN
Status: DISCONTINUED | OUTPATIENT
Start: 2021-08-31 | End: 2021-08-31 | Stop reason: SDUPTHER

## 2021-08-31 RX ORDER — OXYCODONE HYDROCHLORIDE 5 MG/1
10 TABLET ORAL EVERY 4 HOURS PRN
Status: DISCONTINUED | OUTPATIENT
Start: 2021-08-31 | End: 2021-08-31 | Stop reason: SDUPTHER

## 2021-08-31 RX ORDER — NICOTINE POLACRILEX 4 MG
15 LOZENGE BUCCAL PRN
Status: DISCONTINUED | OUTPATIENT
Start: 2021-08-31 | End: 2021-09-01 | Stop reason: HOSPADM

## 2021-08-31 RX ORDER — SENNA AND DOCUSATE SODIUM 50; 8.6 MG/1; MG/1
1 TABLET, FILM COATED ORAL 2 TIMES DAILY
Status: DISCONTINUED | OUTPATIENT
Start: 2021-08-31 | End: 2021-08-31 | Stop reason: SDUPTHER

## 2021-08-31 RX ORDER — TRANEXAMIC ACID 10 MG/ML
1000 INJECTION, SOLUTION INTRAVENOUS
Status: COMPLETED | OUTPATIENT
Start: 2021-08-31 | End: 2021-08-31

## 2021-08-31 RX ORDER — HYDROMORPHONE HCL 110MG/55ML
0.5 PATIENT CONTROLLED ANALGESIA SYRINGE INTRAVENOUS EVERY 5 MIN PRN
Status: DISCONTINUED | OUTPATIENT
Start: 2021-08-31 | End: 2021-08-31

## 2021-08-31 RX ORDER — SODIUM CHLORIDE 0.9 % (FLUSH) 0.9 %
10 SYRINGE (ML) INJECTION EVERY 12 HOURS SCHEDULED
Status: DISCONTINUED | OUTPATIENT
Start: 2021-08-31 | End: 2021-08-31 | Stop reason: HOSPADM

## 2021-08-31 RX ORDER — HYDRALAZINE HYDROCHLORIDE 20 MG/ML
5 INJECTION INTRAMUSCULAR; INTRAVENOUS EVERY 10 MIN PRN
Status: DISCONTINUED | OUTPATIENT
Start: 2021-08-31 | End: 2021-08-31

## 2021-08-31 RX ORDER — OXYCODONE HYDROCHLORIDE 5 MG/1
5 TABLET ORAL EVERY 4 HOURS PRN
Status: DISCONTINUED | OUTPATIENT
Start: 2021-08-31 | End: 2021-08-31 | Stop reason: SDUPTHER

## 2021-08-31 RX ORDER — ACETAMINOPHEN 325 MG/1
650 TABLET ORAL EVERY 6 HOURS
Status: DISCONTINUED | OUTPATIENT
Start: 2021-08-31 | End: 2021-09-01 | Stop reason: HOSPADM

## 2021-08-31 RX ORDER — ONDANSETRON 2 MG/ML
4 INJECTION INTRAMUSCULAR; INTRAVENOUS EVERY 6 HOURS PRN
Status: DISCONTINUED | OUTPATIENT
Start: 2021-08-31 | End: 2021-09-01 | Stop reason: HOSPADM

## 2021-08-31 RX ORDER — ACETAMINOPHEN 500 MG
1000 TABLET ORAL ONCE
Status: COMPLETED | OUTPATIENT
Start: 2021-08-31 | End: 2021-08-31

## 2021-08-31 RX ORDER — CEFAZOLIN SODIUM 2 G/100ML
2000 INJECTION, SOLUTION INTRAVENOUS EVERY 8 HOURS
Status: COMPLETED | OUTPATIENT
Start: 2021-08-31 | End: 2021-09-01

## 2021-08-31 RX ORDER — SODIUM CHLORIDE, SODIUM LACTATE, POTASSIUM CHLORIDE, CALCIUM CHLORIDE 600; 310; 30; 20 MG/100ML; MG/100ML; MG/100ML; MG/100ML
INJECTION, SOLUTION INTRAVENOUS CONTINUOUS
Status: DISCONTINUED | OUTPATIENT
Start: 2021-08-31 | End: 2021-08-31 | Stop reason: SDUPTHER

## 2021-08-31 RX ORDER — FENTANYL CITRATE 50 UG/ML
25 INJECTION, SOLUTION INTRAMUSCULAR; INTRAVENOUS EVERY 5 MIN PRN
Status: DISCONTINUED | OUTPATIENT
Start: 2021-08-31 | End: 2021-08-31 | Stop reason: HOSPADM

## 2021-08-31 RX ORDER — PROMETHAZINE HYDROCHLORIDE 12.5 MG/1
12.5 TABLET ORAL EVERY 6 HOURS PRN
Status: DISCONTINUED | OUTPATIENT
Start: 2021-08-31 | End: 2021-08-31 | Stop reason: SDUPTHER

## 2021-08-31 RX ORDER — CEFAZOLIN SODIUM 2 G/100ML
2000 INJECTION, SOLUTION INTRAVENOUS ONCE
Status: DISCONTINUED | OUTPATIENT
Start: 2021-08-31 | End: 2021-08-31 | Stop reason: SDUPTHER

## 2021-08-31 RX ORDER — SODIUM CHLORIDE 9 MG/ML
25 INJECTION, SOLUTION INTRAVENOUS PRN
Status: DISCONTINUED | OUTPATIENT
Start: 2021-08-31 | End: 2021-09-01 | Stop reason: HOSPADM

## 2021-08-31 RX ORDER — ATROPINE SULFATE 0.1 MG/ML
0.5 INJECTION INTRAVENOUS ONCE
Status: COMPLETED | OUTPATIENT
Start: 2021-08-31 | End: 2021-08-31

## 2021-08-31 RX ADMIN — FENTANYL CITRATE 25 MCG: 50 INJECTION, SOLUTION INTRAMUSCULAR; INTRAVENOUS at 14:22

## 2021-08-31 RX ADMIN — ACETAMINOPHEN 650 MG: 325 TABLET ORAL at 19:06

## 2021-08-31 RX ADMIN — ATROPINE SULFATE 0.25 MG: 0.1 INJECTION, SOLUTION ENDOTRACHEAL; INTRAMUSCULAR; INTRAVENOUS; SUBCUTANEOUS at 12:42

## 2021-08-31 RX ADMIN — FENTANYL CITRATE 25 MCG: 50 INJECTION, SOLUTION INTRAMUSCULAR; INTRAVENOUS at 10:55

## 2021-08-31 RX ADMIN — SODIUM CHLORIDE, POTASSIUM CHLORIDE, SODIUM LACTATE AND CALCIUM CHLORIDE: 600; 310; 30; 20 INJECTION, SOLUTION INTRAVENOUS at 06:52

## 2021-08-31 RX ADMIN — FENTANYL CITRATE 25 MCG: 50 INJECTION, SOLUTION INTRAMUSCULAR; INTRAVENOUS at 09:30

## 2021-08-31 RX ADMIN — MIDAZOLAM 2 MG: 1 INJECTION INTRAMUSCULAR; INTRAVENOUS at 07:33

## 2021-08-31 RX ADMIN — ROPIVACAINE HYDROCHLORIDE 30 ML: 5 INJECTION, SOLUTION EPIDURAL; INFILTRATION; PERINEURAL at 07:28

## 2021-08-31 RX ADMIN — PROMETHAZINE HYDROCHLORIDE 6.25 MG: 25 INJECTION INTRAMUSCULAR; INTRAVENOUS at 09:50

## 2021-08-31 RX ADMIN — DOCUSATE SODIUM AND SENNOSIDES 1 TABLET: 8.6; 5 TABLET, FILM COATED ORAL at 22:05

## 2021-08-31 RX ADMIN — OXYCODONE HYDROCHLORIDE 10 MG: 5 TABLET ORAL at 17:06

## 2021-08-31 RX ADMIN — TRANEXAMIC ACID 1 G: 10 INJECTION, SOLUTION INTRAVENOUS at 07:47

## 2021-08-31 RX ADMIN — CHLOROPROCAINE HYDROCHLORIDE 3 ML: 30 INJECTION, SOLUTION EPIDURAL; INFILTRATION; INTRACAUDAL; PERINEURAL at 07:38

## 2021-08-31 RX ADMIN — RIVAROXABAN 10 MG: 10 TABLET, FILM COATED ORAL at 22:05

## 2021-08-31 RX ADMIN — MIDAZOLAM 2 MG: 1 INJECTION INTRAMUSCULAR; INTRAVENOUS at 07:24

## 2021-08-31 RX ADMIN — PROPOFOL 80 MCG/KG/MIN: 10 INJECTION, EMULSION INTRAVENOUS at 07:40

## 2021-08-31 RX ADMIN — ATROPINE SULFATE 0.5 MG: 0.1 INJECTION, SOLUTION ENDOTRACHEAL; INTRAMUSCULAR; INTRAVENOUS; SUBCUTANEOUS at 13:01

## 2021-08-31 RX ADMIN — SODIUM CHLORIDE, PRESERVATIVE FREE 10 ML: 5 INJECTION INTRAVENOUS at 22:05

## 2021-08-31 RX ADMIN — CEFAZOLIN SODIUM 2000 MG: 2 INJECTION, SOLUTION INTRAVENOUS at 19:35

## 2021-08-31 RX ADMIN — CEFAZOLIN SODIUM 2000 MG: 2 INJECTION, SOLUTION INTRAVENOUS at 07:45

## 2021-08-31 RX ADMIN — ACETAMINOPHEN 1000 MG: 500 TABLET, FILM COATED ORAL at 06:50

## 2021-08-31 RX ADMIN — KETOROLAC TROMETHAMINE 15 MG: 30 INJECTION, SOLUTION INTRAMUSCULAR; INTRAVENOUS at 15:13

## 2021-08-31 ASSESSMENT — PAIN SCALES - GENERAL
PAINLEVEL_OUTOF10: 6
PAINLEVEL_OUTOF10: 0
PAINLEVEL_OUTOF10: 5
PAINLEVEL_OUTOF10: 8
PAINLEVEL_OUTOF10: 0
PAINLEVEL_OUTOF10: 5
PAINLEVEL_OUTOF10: 8
PAINLEVEL_OUTOF10: 10
PAINLEVEL_OUTOF10: 6
PAINLEVEL_OUTOF10: 6

## 2021-08-31 ASSESSMENT — PULMONARY FUNCTION TESTS
PIF_VALUE: 1
PIF_VALUE: 1
PIF_VALUE: 0
PIF_VALUE: 1
PIF_VALUE: 1
PIF_VALUE: 0
PIF_VALUE: 1
PIF_VALUE: 0
PIF_VALUE: 1
PIF_VALUE: 0
PIF_VALUE: 1
PIF_VALUE: 0
PIF_VALUE: 1
PIF_VALUE: 0
PIF_VALUE: 1
PIF_VALUE: 0
PIF_VALUE: 1
PIF_VALUE: 0
PIF_VALUE: 1
PIF_VALUE: 0
PIF_VALUE: 1
PIF_VALUE: 0
PIF_VALUE: 1
PIF_VALUE: 1
PIF_VALUE: 0
PIF_VALUE: 1
PIF_VALUE: 0
PIF_VALUE: 1
PIF_VALUE: 2
PIF_VALUE: 0
PIF_VALUE: 1
PIF_VALUE: 2
PIF_VALUE: 1
PIF_VALUE: 0
PIF_VALUE: 1
PIF_VALUE: 0
PIF_VALUE: 1
PIF_VALUE: 2
PIF_VALUE: 1
PIF_VALUE: 1
PIF_VALUE: 0
PIF_VALUE: 1
PIF_VALUE: 1

## 2021-08-31 ASSESSMENT — PAIN DESCRIPTION - PAIN TYPE: TYPE: SURGICAL PAIN

## 2021-08-31 ASSESSMENT — PAIN DESCRIPTION - ORIENTATION: ORIENTATION: LEFT

## 2021-08-31 ASSESSMENT — PAIN DESCRIPTION - PROGRESSION: CLINICAL_PROGRESSION: GRADUALLY IMPROVING

## 2021-08-31 ASSESSMENT — PAIN DESCRIPTION - LOCATION: LOCATION: HIP

## 2021-08-31 NOTE — ANESTHESIA POSTPROCEDURE EVALUATION
Department of Anesthesiology  Postprocedure Note    Patient: Carlos Jiang  MRN: [id number]  YOB: 1965  Date of evaluation: 8/31/2021  Time:  9:54 AM     Procedure Summary     Date: 08/31/21 Room / Location: [address]    Anesthesia Start: 0730 Anesthesia Stop: 0698    Procedure: LEFT HIP TOTAL ARTHROPLASTY (Left Hip) Diagnosis: (PRIMARY OSTEOARTHRITIS OF LEFT HIP)    Surgeons: Elkin Alexandra DO Responsible Provider: Héctor Griffith MD    Anesthesia Type: spinal, MAC ASA Status: 2          Anesthesia Type: spinal, MAC    Catia Phase I: Catia Score: 9    Catia Phase II:      Last vitals: Reviewed and per EMR flowsheets.        Anesthesia Post Evaluation    Patient location during evaluation: PACU  Patient participation: complete - patient participated  Level of consciousness: awake  Pain score: 1  Airway patency: patent  Nausea & Vomiting: no nausea and no vomiting  Complications: no  Cardiovascular status: blood pressure returned to baseline  Respiratory status: acceptable  Hydration status: euvolemic

## 2021-08-31 NOTE — PROGRESS NOTES
Occupational Therapy  [facility] [place]CCUPATIONAL [place]Y [place]ALUATION    Evaristo Garcia, 1965, OR/NONE, 8/31/2021    Discharge Recommendation:  Inpatient Rehabilitation    History:  Native:  The encounter diagnosis was Status post left hip replacement. Subjective:  Patient states: Agreeable to therapy. Pain: Pt reports 11/10 pain at surgical site. Pt was tearful throughout session related to pain. Communication with other providers: PT, RN  Restrictions: General Precautions, Fall Risk, Anterior hip precautions, WBAT LLE    Home Setup/Prior level of function:  Social/Functional History  Lives With: Spouse  Type of Home: House  Home Layout: Able to Live on Main level with bedroom/bathroom  Home Access: Stairs to enter with rails  Entrance Stairs - Number of Steps: 3  Bathroom Shower/Tub: Tub/Shower unit  Bathroom Toilet: Standard  ADL Assistance: Independent  Homemaking Assistance: Independent  Ambulation Assistance: Independent  Transfer Assistance: Independent  Active : Yes  Occupation: Full time employment  Type of occupation:     Examination:  · Observation: Supine in bed upon arrival  · Vision: Pt reports he has glasses. However, pt does not have them at time of eval.   · [facility]  · Vitals: Stable vitals throughout session    Body Systems and functions:  · ROM: WFL   · Strength: WFL  · Sensation: WFL  · Tone: Normal  · Coordination: WFL  · Perception: WNL    Activities of Daily Living (ADLs):  · Feeding: Kay  Setup, increased time. · Grooming: CGA Setup, increased time, VC throughout. In seated with CGA for dynamic sitting balance. · UB bathing: CGA Setup, increased time, VC throughout. In seated with CGA for dynamic sitting balance. · LB bathing: Abhijit Setup, increased time, VC throughout. Recommend pt complete in seated with assist for distal reaching. Pt provided long handled sponge this date.    · UB dressing: CGA Setup, increased time, VC throughout. Recommend pt complete in seated with CGA for dynamic sitting balance. · LB dressing: ModA Setup, increased time, VC throughout. Anticipate pt would require assist for dynamic sitting and standing balance, distal reaching and threading LB clothing. · Toiletin [address] pt would require assist for commode transfer, wally care and LB clothing manipulation. Cognitive and Psychosocial Functioning:  · Overall cognitive status: Pt oriented to time, date, person, place, city  · Affect: Normal     Balance:   · Sitting: CGA (pt sat EOB demo fair+ dynamic sitting balance). · Standing: ModA (pt stood with RW. Demo fair- dynamic standing balance). Functional Mobility:   · Bed Mobility: ModA X2 (pt performed supine to seated bed mobility with assist for BLE positioning, trunk support, and VC for sequencing throughout). · Transfers: ModA  (pt performed STS from EOB with RW. Required VC throughout for sequencing and increased time. PT required assist d/t weakness and for initiation). · Ambulation: Abhijit-ModA (pt ambulated approx 15ft with RW. Demo slow pace throughout and 0 LOB. Pt demo antalgic gait pattern. See PT note for specific details on gait). AM-PAC 6 click short form for inpatient daily activity:   How much help from another person does the patient currently need. .. Unable  Dep A Lot  Max A A Lot   Mod A A Little  Min A A Little   CGA  SBA None   Mod I  Indep  Sup   1. Putting on and taking off regular lower body clothing? [] 1    [] 2   [x] 2   [] 3   [] 3   [] 4      2. Bathing (including washing, rinsing, drying)? [] 1   [] 2   [] 2 [x] 3 [] 3 [] 4   3. Toileting, which includes using toilet, bedpan, or urinal? [] 1    [] 2   [x] 2   [] 3   [] 3   [] 4     4. Putting on and taking off regular upper body clothing? [] 1   [] 2   [] 2   [] 3   [x] 3    [] 4      5. Taking care of personal grooming such as brushing teeth? [] 1   [] 2    [] 2 [] 3    [x] 3   [] 4      6.  Eating meals?   [] 1   [] 2   [] 2   [] 3   [] 3   [x] 4      Raw Score:  17     [24=0% impaired(CH), 23=1-19%(CI), 20-22=20-39%(CJ), 15-19=40-59%(CK), 10-14=60-79%(CL), 7-9=80-99%(CM), 6=100%(CN)]    Treatment:  Therapeutic Activity Training:   Therapeutic activity training was instructed today. Cues were given for safety, sequence, UE/LE placement, awareness, and balance. Activities performed today included bed mobility training, sup-sit, sit-stand, SPT, ambulation. Safety Measures: Gait belt used, Left in bed, Alarm in place    Assessment:  Assessment  Performance deficits / Impairments: Decreased functional mobility , Decreased ROM, Decreased endurance, Decreased balance, Decreased high-level IADLs, Decreased ADL status, Decreased strength, Decreased posture  Treatment Diagnosis: Primary OA of L hip  Prognosis: Good  Decision Making: Medium Complexity  REQUIRES OT FOLLOW UP: Yes  Discharge Recommendations: Sary Love    Pt is a 48year old M admitted for primary OA of L hip. Pt underwent L hip total arthroplasty 8/31/2021. Pt reports that prior to admission he was IND in ADLs, IADLs, and functional mobility. This date, pt demo decreased strength, decreased AROM of LLE, and decreased activity tolerance and overall functional mobility impacting ADL status. Pt is currently functioning below baseline and would benefit from skilled OT services at [facility]. Plan:  Plan  Times per week: 2+  Times per day: Daily      Goals:  1. Pt will complete all aspects of bed mobility for EOB/OOB ADLs ModA. 2. Pt will complete LB bathing with CGA and AE as needed. 3. Pt will complete all aspects of LB dressing with CGA and AE as needed. 4. Pt will complete all functional transfers to and from bed, chair, toilet, shower chair with Abhijit and AD. 5. Pt will ambulate HH distance to bathroom for toileting with CGA and AD. 6. Pt will complete all aspects of toileting task with Abhijit.   7. Pt will complete oral hygiene/grooming routine in standing at sink with CGA demo good dynamic standing balance for approx 8 minutes. 8. Pt will complete ther ex/ther act with focus on UB strengthening. Time:   Time in: 1350  Time out: 1430  Timed treatment minutes: 25  Total time: 40    Electronically signed by:     Isabel Sifuentes S/OT    Electronically signed by: Cleophas Lanes, OTR/L, [facility], [id number]    \"I, the qualified professional, was present and in the room for the entire session. The student participates in the delivery of services when the qualified practitioner is directing the service, making the skilled judgment, and is responsible for the assessment and treatment. \"

## 2021-08-31 NOTE — PROGRESS NOTES
Physical Therapy    Facility/Department: [facility] OR  Initial Assessment    NAME: Evangelist Simpson  : 1965  MRN: [id number]    Date of Service: 2021    Discharge Recommendations:  IP Rehab        Assessment   Assessment: Pt is a 48 y.o. male with medical history, surgical history, co-morbidities, and personal factors including Arthritis, Diabetes mellitus, Hyperlipidemia, and Toe Surgery with admission for L hip DJD and s/p L PATRICIO. Prior to admission, pt was independent with functional mobility and ADLs. Examination of body systems reveals decreased strength, decreased balance, decreased aerobic capacity, and decreased independence with functional mobility. Prognosis: Good  Decision Making: Medium Complexity  Clinical Presentation: evolving  PT Education: Goals;PT Role;Plan of Care;Equipment; Functional Mobility Training;Transfer Training;Gait Training;General Safety  REQUIRES PT FOLLOW UP: Yes  Activity Tolerance  Activity Tolerance: Patient Tolerated treatment well         Restrictions  Restrictions/Precautions  Restrictions/Precautions: General Precautions, Weight Bearing  Lower Extremity Weight Bearing Restrictions  Left Lower Extremity Weight Bearing: Weight Bearing As Tolerated  Position Activity Restriction  Hip Precautions: No hip external rotation, No ADduction, No hip extension     Vision/Hearing  Vision: Within Functional Limits  Hearing: Within functional limits       Subjective  General  Chart Reviewed: Yes  Patient assessed for rehabilitation services?: Yes  Family / Caregiver Present: Yes  Follows Commands: Within Functional Limits  Pain Screening  Patient Currently in Pain: Yes  Pain Assessment  Pain Level: 10  Pain Type: Surgical pain  Pain Location: Hip  Pain Orientation: Left  Vital Signs  Patient Currently in Pain: Yes       Orientation  Orientation  Overall Orientation Status: Within Normal Limits     Social/Functional History  Social/Functional History  Lives With: Spouse  Type of Home: House  Home Layout: Able to Live on Main level with bedroom/bathroom  Home Access: Stairs to enter with rails  Entrance Stairs - Number of Steps: 3  Bathroom Shower/Tub: Tub/Shower unit  Bathroom Toilet: Standard  ADL Assistance: Independent  Homemaking Assistance: Independent  Ambulation Assistance: Independent  Transfer Assistance: Independent  Active : Yes  Occupation: Full time employment  Type of occupation:      Cognition   Cognition  Overall Cognitive Status: WFL    Objective             Strength RLE  Strength RLE: WFL  Strength LLE  Comment: knee extension: 2/5, ankle DF: 4+/5     Sensation  Overall Sensation Status: WNL  Bed mobility  Supine to Sit: Moderate assistance;2 Person assistance (with verbal and tactile cues for BUE and BLE placement throughout transfer; with increased time for task completion; with HOB elevated)  Sit to Supine: Moderate assistance;2 Person assistance  Transfers  Sit to Stand: Moderate Assistance;2 Person Assistance (with verbal cues to push through bed and avoid pulling on walker)  Stand to sit: Moderate Assistance;2 Person Assistance (with verbal cues to feel bed against back of legs, reach back, and sit slowly)  Ambulation  Ambulation?: Yes  Ambulation 1  Surface: level tile  Device: Rolling Walker  Assistance: Minimal assistance; Moderate assistance  Quality of Gait: decreased gait speed, decreased step length bilaterally, decreased stance time on LLE, antalgic, unsteady  Distance: 15 feet + 15 feet  Comments: with verbal and tactile cues for BLE placement, walker placement, and sequence throughout ambulation; with verbal and tactile cues to maintain upright posture in order to avoid COM shifting outside of ANDREAS     Balance  Posture: Fair  Sitting - Static: Good  Sitting - Dynamic: Good  Standing - Static: Fair;-  Standing - Dynamic: Poor;+      Gait Training:  Cues were given for safety, sequence, device management, balance, posture, awareness, path. Therapeutic Activity Training:   Therapeutic activity training was instructed today. Cues were given for safety, sequence, UE/LE placement, awareness, and balance. Activities performed today included bed mobility training, sup-sit, sit-stand. Plan   Plan  Times per week: 7 BID  Current Treatment Recommendations: Strengthening, ROM, Balance Training, Functional Mobility Training, Transfer Training, ADL/Self-care Training, Gait Training, Patient/Caregiver Education & Training, IADL Training, Stair training, Equipment Evaluation, Education, & procurement, Neuromuscular Re-education, Pain Management, Home Exercise Program, Positioning, Endurance Training, Safety Education & Training  Safety Devices  Type of devices: All fall risk precautions in place, Left in bed, Bed alarm in place, Call light within reach, Nurse notified, Gait belt, Patient at risk for falls      AM-PAC Score  AM-PAC Inpatient Mobility Raw Score : 11 (08/31/21 1512)  AM-PAC Inpatient T-Scale Score : 33.86 (08/31/21 1512)  Mobility Inpatient CMS 0-100% Score: 72.57 (08/31/21 1512)  Mobility Inpatient CMS G-Code Modifier : CL (08/31/21 1512)          Goals  Long term goals  Time Frame for Long term goals :  In one week:  Long term goal 1: Pt will complete all bed mobility with SBAx1  Long term goal 2: Pt will complete sit <> stand transfers with SBAx1  Long term goal 3: Pt will ambulate 100 feet with SBAx1 with LRAD  Long term goal 4: Pt will ascend/descend 6 steps with a handrail with minAx1  Long term goal 5: Pt will independently complete 3 sets of 10 reps of BLE AROM exercises in available and allowed ROM       Time In: 1350  Time Out: 1430  Total Treatment Time: 40  Timed Code Treatment Minutes: [address] [facility] Gabrielle Lubin PT, DPT  License #: [id number]

## 2021-08-31 NOTE — ANESTHESIA PROCEDURE NOTES
Peripheral Block    Patient location during procedure: PACU  Start time: 8/31/2021 7:24 AM  End time: 8/31/2021 7:28 AM  Staffing  Performed: resident/CRNA   Resident/CRNA: DEANA Abrams CRNA  Preanesthetic Checklist  Completed: patient identified, IV checked, site marked, risks and benefits discussed, surgical consent, monitors and equipment checked, pre-op evaluation, timeout performed, anesthesia consent given, oxygen available and patient being monitored  Peripheral Block  Patient position: supine  Prep: ChloraPrep  Patient monitoring: cardiac monitor, continuous pulse ox, continuous capnometry, frequent blood pressure checks and IV access  Block type: Fascia iliaca  Laterality: left  Injection technique: single-shot  Guidance: ultrasound guided  Local infiltration: lidocaine  Infiltration strength: 2 %  Dose: 3 mL  Provider prep: mask and sterile gloves  Local infiltration: lidocaine  Needle  Needle type: long-bevel   Needle gauge: 22 G  Needle length: 8 cm  Assessment  Injection assessment: negative aspiration for heme and no paresthesia on injection  Slow fractionated injection: yes  Hemodynamics: stable  Medications Administered  Ropivacaine (NAROPIN) injection 0.5%, 30 mL  Reason for block: post-op pain management and at surgeon's request

## 2021-08-31 NOTE — BRIEF OP NOTE
Brief Postoperative Note      Patient: Eitan Gold  YOB: 1965  MRN: [id number]    Date of Procedure: 8/31/2021    Pre-Op Diagnosis: PRIMARY OSTEOARTHRITIS OF LEFT HIP    Post-Op Diagnosis: Same       Procedure(s):  LEFT HIP TOTAL ARTHROPLASTY    Surgeon(s):  Alcario Burly, DO Danell Dakins, DO    Assistant:  Physician Assistant: Jet Jang PA-C    Anesthesia: Spinal    Estimated Blood Loss (mL): less than 574     Complications: None    Specimens:   * No specimens in log *    Implants:  Implant Name Type Inv.  Item Serial No.  Lot No. LRB No. Used Action   SCREW BNE L30MM DIA6.5MM CANC TIV ST COUNTERSUNK FOR ACET  SCREW BNE L30MM DIA6.5MM CANC TIV ST COUNTERSUNK FOR ACET  [employer] [id number] Left 1 Implanted   G7 LONGEVITY NEUTRAL 36MM F  G7 LONGEVITY NEUTRAL 36MM F  [employer]WD [id number] Left 1 Implanted   SHELL ACET SZ F [id number] 4 H OSSEOTI LIMIT H 2 MOBILITY G7  SHELL ACET SZ F [id number] 4 H OSSEOTI LIMIT H 2 MOBILITY G7  [employer]- [id number] Left 1 Implanted   ECHO B-MTRC MP FP HO 11  ECHO B-MTRC MP FP HO 11  [employer]WD [id number] Left 1 Implanted   HEAD FEM [id number] HIP BIOLOX DELT OPT FOR G7 ACET SYS  HEAD FEM [id number] HIP BIOLOX DELT OPT FOR G7 ACET SYS  [employer]WD [id number] Left 1 Implanted   IMPL HIP HEAD FEM BIOLOX -6 NECK TAPR Hip IMPL HIP HEAD FEM BIOLOX -6 NECK TAPR  [employer] [id number] Left 1 Implanted         Drains: * No LDAs found *    Findings: L hip OA    Electronically signed by Mariluz Vargas DO on 8/31/2021 at 8:35 AM

## 2021-08-31 NOTE — ANESTHESIA PROCEDURE NOTES
Spinal Block    Patient location during procedure: OR  Start time: 8/31/2021 7:34 AM  End time: 8/31/2021 7:38 AM  Reason for block: primary anesthetic and at surgeon's request  Staffing  Performed: resident/CRNA   Resident/CRNA: DEANA López CRNA  Preanesthetic Checklist  Completed: patient identified, IV checked, site marked, risks and benefits discussed, surgical consent, monitors and equipment checked, pre-op evaluation, timeout performed, anesthesia consent given, oxygen available and patient being monitored  Spinal Block  Patient position: sitting  Prep: ChloraPrep  Patient monitoring: cardiac monitor, continuous pulse ox, continuous capnometry and frequent blood pressure checks  Approach: midline  Location: L3/L4  Provider prep: mask and sterile gloves  Local infiltration: lidocaine  Needle  Needle type: Quincke   Needle gauge: 22 G  Needle length: 3.5 in  Assessment  Sensory level: T6  Swirl obtained: Yes  CSF: clear  Attempts: 1  Hemodynamics: stable  Additional Notes  3% chloraprocaine 3 ml IT injection.  Pt tolerated well, vss.

## 2021-08-31 NOTE — CONSULTS
CARDIOLOGY CONSULT NOTE       Reason for consultation:     Referring physician:  Lyn Doan DO     Primary care physician: Latrell Peñaloza    Thanks for the consult. History of present illness:Jesus is a 48 y. o.year old who  presents with had no chief complaint listed for this encounter. No chief complaint on file. Patient with history of obesity, diabetes, high cholesterol, patient on Metformin and Lipitor  Patient admitted and underwent left hip replacement under spinal anesthesia, following surgery patient developed bradycardia hypotension currently blood pressures in the 85E systolic patient denies any chest pain no shortness of breath heart rate 47 beats a minute EKG was unremarkable patient had a 2D echo done yesterday which showed normal LV function EF 56% valves was normal with mild to moderate mitral regurgitation    Past medical history:    has a past medical history of Arthritis, Diabetes mellitus ([address] and Hyperlipidemia. Past surgical history:   has a past surgical history that includes Toe Surgery. Social History:   reports that he has been smoking cigarettes. He has a 15.00 pack-year smoking history. He has never used smokeless tobacco. He reports current alcohol use. He reports that he does not use drugs. Family history:  family history is not on file.     No Known Allergies    lactated ringers infusion, Continuous  sodium chloride flush 0.9 % injection 10 mL, 2 times per day  sodium chloride flush 0.9 % injection 10 mL, PRN  0.9 % sodium chloride infusion, PRN  lactated ringers infusion, Continuous  fentaNYL (SUBLIMAZE) injection 25 mcg, Q5 Min PRN  HYDROmorphone (DILAUDID) injection 0.5 mg, Q5 Min PRN  HYDROmorphone (DILAUDID) injection 0.25 mg, Q5 Min PRN  morphine (PF) injection 2 mg, Q5 Min PRN  labetalol (NORMODYNE;TRANDATE) injection 5 mg, Q10 Min PRN  hydrALAZINE (APRESOLINE) injection 5 mg, Q10 Min PRN  atropine injection 0.25 mg, Once      Current Facility-Administered Medications   Medication Dose Route Frequency Provider Last Rate Last Admin    lactated ringers infusion   IntraVENous Continuous [place], DO        sodium chloride flush 0.9 % injection 10 mL  10 mL IntraVENous 2 times per day Emily Harbour, DO        sodium chloride flush 0.9 % injection 10 mL  10 mL IntraVENous PRN [place], DO        0.9 % sodium chloride infusion  25 mL IntraVENous PRN Emily [place], DO        lactated ringers infusion   IntraVENous Continuous [place],  mL/hr at 08/31/21 1396 Rate Change at 08/31/21 8479    fentaNYL (SUBLIMAZE) injection 25 mcg  25 mcg IntraVENous Q5 Min PRN Dm Franco MD   25 mcg at 08/31/21 1055    HYDROmorphone (DILAUDID) injection 0.5 mg  0.5 mg IntraVENous Q5 Min PRN mD Franco MD        HYDROmorphone (DILAUDID) injection 0.25 mg  0.25 mg IntraVENous Q5 Min PRN Dm Franco MD        morphine (PF) injection 2 mg  2 mg IntraVENous Q5 Min PRN Dm Franco MD        labetalol (NORMODYNE;TRANDATE) injection 5 mg  5 mg IntraVENous Q10 Min PRN Dm Franco MD        hydrALAZINE (APRESOLINE) injection 5 mg  5 mg IntraVENous Q10 Min PRN Dm Franco MD        atropine injection 0.25 mg  0.25 mg IntraVENous Once Ashley Dudley MD         Review of Systems:   · Constitutional: No Fever or Weight Loss   · Eyes: No Decreased Vision  · ENT: No Headaches, Hearing Loss or Vertigo  · Cardiovascular: No chest pain, dyspnea on exertion, palpitations or loss of consciousness  · Respiratory: No cough or wheezing    · Gastrointestinal: No abdominal pain, appetite loss, blood in stools, constipation, diarrhea or heartburn  · Genitourinary: No dysuria, trouble voiding, or hematuria  · Musculoskeletal:  No gait disturbance, weakness or joint complaints  · Integumentary: No rash or pruritis  · Neurological: No TIA or stroke symptoms  · Psychiatric: No anxiety or depression  · Endocrine: No malaise, fatigue or temperature intolerance  · Hematologic/Lymphatic: No bleeding problems, blood clots or swollen lymph nodes  · Allergic/Immunologic: No nasal congestion or hives  ·   ·      Physical Examination:    Vitals:    08/31/21 1215   BP: (!) 97/54   Pulse: (!) 44   Resp:    Temp:    SpO2: 96%        General Appearance:      HEENT: normal    NECK: No JVP or carotid bruits  No thromegaly  Cardiovascular: Auscultation: Normal S1 and S2,      Respiratory:  Breath sounds are Normal    Extremities:  No Edema clubbing or cyanosis    SKIN: Warm and well perfused, no pallor or cyanosis    Vascular exam:  : ,Femoral Arteries: 2+ and equal, Pedal Pulses: 2+ and equal     Abdomen: nontender      Neurological/Psychiatric:  nonfocal  Normal affect  Lab Review   No results for input(s): WBC, HGB, HCT, PLT in the last 72 hours. No results for input(s): NA, K, CL, CO2, PHOS, BUN, CREATININE in the last 72 hours. Invalid input(s): CA  No results for input(s): AST, ALT, ALB, BILIDIR, BILITOT, ALKPHOS in the last 72 hours. No results for input(s): TROPONINI in the last 72 hours.   No results found for: BNP  No results found for: INR, PROTIME    QUALITY MEASURES:  CAD:     EKG:    Chest Xray:    ECHO:  Labs, echo, meds reviewed  Assessment:   [unfilled]   Patient Active Problem List   Diagnosis Code    Primary osteoarthritis of left hip M16.12     [unfilled]  Problem List Items Addressed This Visit     None      Visit Diagnoses     Status post left hip replacement    -  Primary    Relevant Medications    oxyCODONE-acetaminophen (PERCOCET) 5-325 MG per tablet      1 bradycardia and hypotension  Following surgery, will give 0.25 mg of atropine  Placed on telemetry monitor, had a 2D echo done yesterday which showed normal LV function and wall motion EKG is unremarkable no acute ischemia  2 study post left hip replacement  3 diabetes resume home medication  Management hospitalist group  4 high cholesterol  5 obesity chronic due to excess caloric intake  Recommendations:   Telemetry monitor  Atropine 0.25 IV x1     Mary Phillip MD, 8/31/2021 12:35 PM

## 2021-08-31 NOTE — CONSULTS
Consult Note (Hospitalist, Internal Medicine)  IDENTIFYING INFORMATION   PATIENT:  Sudha Mcdermott  MRN:  [id number]  ADMIT DATE: 8/31/2021  TIME OF EVALUATION: 8/31/2021 7:45 PM    REASON FOR CONSULT   Medical management    HISTORY OF PRESENT ILLNESS   Sudha Mcdermott is a 48 y.o. male with diabetes mellitus type 2, not on chronic insulin, hyperlipidemia, arthritis who was admitted for left hip arthroplasty. Patient seen post procedure. Patient's became bradycardic post procedure, heart rate was down to 34. As per the information patient was not nauseated, diaphoretic at that time. Also blood pressure was down to 85/30. Cardiology was consulted. Patient received atropine. Patient symptoms currently resolved. Heart rate within normal range. Except for pain in his left hip patient denied any complaints. Denied any chest pain, shortness of breath, denied any abdominal pain, denied any urinary complaints. PAST MEDICAL HISTORY PAST SURGICAL HISTORY   Diabetes mellitus type 2, hyperlipidemia, arthritis  left toe surgery   FAMILY HISTORY SOCIAL HISTORY   Reviewed and noncontributory  admits to smoking half pack per day, denies any alcohol or illicit drug use   MEDICATIONS ALLERGIES    Metformin 1000 mg twice daily, atorvastatin 20 mg daily, aspirin 81 mg daily. No known drug allergies. PAST MEDICAL, SURGICAL, FAMILY, and SOCIAL HISTORY     Past Medical History:   Diagnosis Date    Arthritis     Diabetes mellitus ([address]     Hyperlipidemia      Past Surgical History:   Procedure Laterality Date    TOE SURGERY      1 screw placed in left middle toe     History reviewed. No pertinent family history.   Social History     Socioeconomic History    Marital status:      Spouse name: None    Number of children: None    Years of education: None    Highest education level: None   Occupational History    None   Tobacco Use    Smoking status: Current Every Day Smoker     Packs/day: 0.50 Years: 30.00     Pack years: 15.00     Types: Cigarettes    Smokeless tobacco: Never Used   Vaping Use    Vaping Use: Never used   Substance and Sexual Activity    Alcohol use: Yes     Comment: occasionally/socially    Drug use: Never    Sexual activity: None   Other Topics Concern    None   Social History Narrative    None     Social Determinants of Health     Financial Resource Strain:     Difficulty of Paying Living Expenses:    Food Insecurity:     Worried About Running Out of Food in the Last Year:     Ran Out of Food in the Last Year:    Transportation Needs:     Lack of Transportation (Medical):      Lack of Transportation (Non-Medical):    Physical Activity:     Days of Exercise per Week:     Minutes of Exercise per Session:    Stress:     Feeling of Stress :    Social Connections:     Frequency of Communication with Friends and Family:     Frequency of Social Gatherings with Friends and Family:     Attends Druze Services:     Active Member of Clubs or Organizations:     Attends Club or Organization Meetings:     Marital Status:    Intimate Partner Violence:     Fear of Current or Ex-Partner:     Emotionally Abused:     Physically Abused:     Sexually Abused:        MEDICATIONS   Medications Prior to Admission  Medications Prior to Admission: metFORMIN (GLUCOPHAGE) 1000 MG tablet, TAKE 1 TABLET BY MOUTH TWICE DAILY WITH MEALS  atorvastatin (LIPITOR) 20 MG tablet, TAKE 1 TABLET BY MOUTH ONCE DAILY FOR 90 DAYS  EQ ASPIRIN ADULT LOW DOSE 81 MG EC tablet, TAKE 1 BY MOUTH ONCE DAILY  ibuprofen (ADVIL;MOTRIN) 200 MG tablet, Take 200 mg by mouth every 6 hours as needed for Pain    Current Medications  Current Facility-Administered Medications   Medication Dose Route Frequency Provider Last Rate Last Admin    lactated ringers infusion   IntraVENous Continuous Gasper Herman DO        sodium chloride flush 0.9 % injection 10 mL  10 mL IntraVENous 2 times per day Gasper Herman DO  sodium chloride flush 0.9 % injection 10 mL  10 mL IntraVENous PRN Rylee Bienenstock, DO        0.9 % sodium chloride infusion  25 mL IntraVENous PRN Rylee Bienenstock, DO        acetaminophen (TYLENOL) tablet 650 mg  650 mg Oral Q6H Rylee Bienenstock, DO   650 mg at 08/31/21 1906    oxyCODONE (ROXICODONE) immediate release tablet 5 mg  5 mg Oral Q4H PRN Rylee Bienenstock, DO        Or    oxyCODONE (ROXICODONE) immediate release tablet 10 mg  10 mg Oral Q4H PRN Rylee Bienenstock, DO   10 mg at 08/31/21 1706    promethazine (PHENERGAN) tablet 12.5 mg  12.5 mg Oral Q6H PRN Rylee Bienenstock, DO        Or    ondansetron [facility]) injection 4 mg  4 mg IntraVENous Q6H PRN Rylee Bienenstock, DO        sennosides-docusate sodium (SENOKOT-S) 8.6-50 MG tablet 1 tablet  1 tablet Oral BID Rylee Bienenstock, DO        magnesium hydroxide (MILK OF MAGNESIA) 400 MG/5ML suspension 30 mL  30 mL Oral Daily PRN Rylee Bienenstock, DO        rivaroxaban (XARELTO) tablet 10 mg  10 mg Oral Daily Rylee Bienenstock, DO        ketorolac (TORADOL) injection 15 mg  15 mg IntraVENous Q6H PRN Rylee Bienenstock, DO   15 mg at 08/31/21 1513    ceFAZolin (ANCEF) 2000 mg in dextrose 4 % 100 mL IVPB (premix)  2,000 mg IntraVENous Q8H Rylee Bienenstock,  mL/hr at 08/31/21 1935 2,000 mg at 08/31/21 1935         Allergies  No Known Allergies    REVIEW OF SYSTEMS     ROS  10 point review of systems reviewed. Pertinent positive or negative as per HPI or otherwise reviewed as negative    PHYSICAL EXAM     Wt Readings from Last 3 Encounters:   08/25/21 206 lb (93.4 kg)   06/07/21 206 lb (93.4 kg)       Blood pressure (!) 135/56, pulse 75, temperature 97 °F (36.1 °C), temperature source Tympanic, resp. rate 18, SpO2 96 %. GEN  -Awake, alert, NAD.   EYES   -PERRL. HENT  -MM are moist.   RESP  -LS CTA equal bilat, no wheezes, rales or rhonchi. Symmetric chest movement. No respiratory distress noted. C/V  -S1/S2 auscultated. RRR without appreciable M/R/G. No JVD or carotid bruits. Peripheral pulses equal bilaterally and palpable. No peripheral edema. No reproducible chest wall tenderness. GI  -Abdomen is soft, non-distended, no significant tenderness. No masses or guarding. + BS in all quadrants. Rectal exam deferred.   -No CVA tenderness. Reyes catheter is not present. MS  -B/L extremities strong muscles strength. Full movements. No gross joint deformities. No swelling, intact sensation symmetrical.   SKIN  -Normal coloration, warm, dry. NEURO  -awake, alert, oriented x3      LABS AND IMAGING   Results for Vanessa Harley (MRN [id number]) as of 8/31/2021 22:53   Ref.  Range 8/25/2021 11:40   Sodium Latest Ref Range: 135 - 145 MMOL/L 140   Potassium Latest Ref Range: 3.5 - 5.1 MMOL/L 4.6   Chloride Latest Ref Range: 99 - 110 mMol/L 105   CO2 Latest Ref Range: 21 - 32 MMOL/L 24   BUN Latest Ref Range: 6 - 23 MG/DL 17   Creatinine Latest Ref Range: 0.9 - 1.3 MG/DL 0.5 (L)   Anion Gap Latest Ref Range: 4 - 16  11   GFR Non- Latest Ref Range: >60 mL/min/1.73m2 >60   GFR African American Latest Ref Range: >60 mL/min/1.73m2 >60   Glucose Latest Ref Range: 70 - 99 MG/DL 87   Calcium Latest Ref Range: 8.3 - 10.6 MG/DL 9.9   Total Protein Latest Ref Range: 6.4 - 8.2 GM/DL 6.9   Albumin Latest Ref Range: 3.4 - 5.0 GM/DL 4.7   Alk Phos Latest Ref Range: 40 - 128 IU/L 108   ALT Latest Ref Range: 10 - 40 U/L 7 (L)   AST Latest Ref Range: 15 - 37 IU/L <5 (L)   Bilirubin Latest Ref Range: 0.0 - 1.0 MG/DL 0.3   WBC Latest Ref Range: 4.0 - 10.5 K/CU MM 7.5   RBC Latest Ref Range: 4.6 - 6.2 M/CU MM 4.90   Hemoglobin Quant Latest Ref Range: 13.5 - 18.0 GM/DL 15.3   Hematocrit Latest Ref Range: 42 - 52 % 46.1   MCV Latest Ref Range: 78 - 100 FL 94.1   MCH Latest Ref Range: 27 - 31 PG 31.2 (H)   MCHC Latest Ref Range: 32.0 - 36.0 % 33.2   MPV Latest Ref Range: 7.5 - 11.1 FL 10.3   RDW Latest Ref Range: 11.7 - 14.9 % 11.9   Platelet Count Latest Ref Range: 140 - 440 K/CU    Lymphocyte % Latest Ref Range: 24 - 44 % 35.5   Monocytes % Latest Ref Range: 0 - 4 % 8.3 (H)   Eosinophils % Latest Ref Range: 0 - 3 % 2.8   Basophils % Latest Ref Range: 0 - 1 % 0.7   Lymphocytes Absolute Latest Units: K/CU MM 2.7   Monocytes Absolute Latest Units: K/CU MM 0.6   Eosinophils Absolute Latest Units: K/CU MM 0.2   Basophils Absolute Latest Units: K/CU MM 0.1   Differential Type Unknown AUTOMATED DIFFERENTIAL   Segs Relative Latest Ref Range: 36 - 66 % 52.4   Segs Absolute Latest Units: K/CU MM 3.9   Nucleated RBC % Latest Units: % 0.0   Immature Neutrophil % Latest Ref Range: 0 - 0.43 % 0.3   Total Immature Neutrophil Latest Units: K/CU MM 0.02   Total Nucleated RBC Latest Units: K/CU MM 0.0   Sed Rate Latest Ref Range: 0 - 20 MM/HR 11   Results for Loy Zamora (MRN [id number]) as of 8/31/2021 22:53   Ref. Range 8/25/2021 11:52   Color, UA Latest Ref Range: YELLOW  YELLOW   Clarity, UA Latest Ref Range: CLEAR  CLEAR   Bilirubin, Urine Latest Ref Range: NEGATIVE MG/DL NEGATIVE   Ketones, Urine Latest Ref Range: NEGATIVE MG/DL NEGATIVE   Specific Gravity, UA Latest Ref Range: 1.001 - 1.035  1.015   Blood, Urine Latest Ref Range: NEGATIVE  NEGATIVE   Protein, UA Latest Ref Range: NEGATIVE MG/DL NEGATIVE   Urobilinogen, Urine Latest Ref Range: 0.2 - 1.0 MG/DL NEGATIVE   Leukocyte Esterase, Urine Latest Ref Range: NEGATIVE  NEGATIVE   Glucose, Urine Latest Ref Range: NEGATIVE MG/DL NEGATIVE   Nitrite Urine, Quantitative Latest Ref Range: NEGATIVE  NEGATIVE   pH, Urine Latest Ref Range: 5.0 - 8.0  5.0   Mucus, UA Latest Ref Range: NEGATIVE HPF RARE (A)   WBC, UA Latest Ref Range: 0 - 2 /HPF 2   RBC, UA Latest Ref Range: 0 - 3 /HPF <1   Bacteria, UA Latest Ref Range: NEGATIVE /HPF NEGATIVE   Trichomonas, UA Latest Ref Range: NONE SEEN /HPF NONE SEEN   Results for Loy Zamora (MRN [id number]) as of 8/31/2021 22:53   Ref.  Range 8/31/2021 11:53   TSH, High Sensitivity Latest Ref Range: 0.270 - 4.20 uIu/ml 3.440     Results for Israel Kumar (MRN [id number]) as of 8/31/2021 22:53   Ref. Range 8/31/2021 12:00   pH, Bld Latest Ref Range: 7.34 - 7.45  7.43   Base Excess Latest Ref Range: 0 - 3.3  1   O2 Sat Latest Ref Range: 96 - 97 % 94.2 (L)   Carbon Monoxide, Blood Latest Ref Range: 0 - 5 % 2.8   CO2 Content Latest Ref Range: 19 - 24 MMOL/L 23.6   pCO2, Arterial Latest Ref Range: 32 - 45 MMHG 34.0   pO2, Arterial Latest Ref Range: 75 - 100 MMHG 76   HCO3, Arterial Latest Ref Range: 18 - 23 MMOL/L 22.6   Methemoglobin, Arterial Latest Ref Range: <1.5 % 1.1     Recent Imaging    XR HIP 1 VW W PELVIS LEFT [[id number]] Collected: 08/31/21 1044      Order Status: Completed Updated: 08/31/21 1055     Narrative:       EXAMINATION:   ONE XRAY VIEW OF THE PELVIS AND TWO XRAY VIEWS LEFT HIP     8/31/2021 8:46 am     COMPARISON:   03/08/2021     HISTORY:   ORDERING SYSTEM PROVIDED HISTORY: S/P L PATRICIO   TECHNOLOGIST PROVIDED HISTORY:   Of operative side while in recovery room. Reason for exam:->S/P L PATRICIO   Reason for Exam: S/P L PATRICIO     FINDINGS:   There is a left total hip arthroplasty with noncemented components.  No acute   fracture or dislocation.  No acute hardware failure or loosening.  Grossly   normal alignment. There is moderate right hip osteoarthritis with joint space narrowing and   small marginal osteophytes.      Impression:       Left total hip arthroplasty without acute hardware complication. Mild to moderate right hip osteoarthritis. Relevant labs and imaging reviewed    ASSESSMENT AND PLAN     #. Diabetes mellitus type 2:  -Patient on Metformin 1000 mg twice daily  -Resume home medications, POC glucose AC at bedtime    #. bradycardia and hypotension-resolved  -Cardiology was consulted. -Patient received atropine 0.25 mg x 1, 0.5 mg x 1  -Currently heart rate ranging in 70s, /56.  -Monitor    #.   S/p left hip total arthroplasty:  -Management as per orthopedic surgeon  -PT/OT when appropriate  -Analgesics as needed  -On Xarelto for DVT prophylaxis    #.   Hyperlipidemia: Continue atorvastatin    DVT prophylaxis: Leslie Haas MD  Hospitalist, Internal Medicine  8/31/2021 at 7:45 PM no

## 2021-08-31 NOTE — OP NOTE
DATE OF PROCEDURE:  8/31/2021    PREOPERATIVE DIAGNOSIS:  Left hip DJD. POSTOPERATIVE DIAGNOSIS:  Left hip DJD. PROCEDURE:  Left total hip arthroplasty using Biomet G7 size 54  acetabular cup with 1 screw with 36 neutral liner with Echo Micro size 11 press-fit  femoral stem and size 36 -6 ceramic femoral head. SURGEON:  Elise George DO.    FIRST ASSISTANT: VALE Mirza    ANESTHESIA:  Spinal with block. ESTIMATED BLOOD LOSS:  100 mL. FLUIDS:  1400 mL crystalloids. INDICATIONS OF PROCEDURE:  The patient is a 56-year-old male with  long standing history of left hip pain. For that, he underwent  conservative treatment with no relief of symptoms. X-ray revealed  severe arthritis and in the left hip. Give his persistent worsening  symptoms despite conservative treatment and with his x-ray findings, I  recommended surgical treatment. I explained the risks, benefits and  possible complications of the procedure to the patient and after  answering all of his questions, he consented to undergo the above  procedure. DESCRIPTION OF PROCEDURE:  The patient was seen and evaluated in the  preoperative holding area where the left lower extremity was signed  in his presence. At this point, care of the patient was turned over  to anesthesia team, who performed a regional block to the left lower  extremity. He was then transported back to the operative suite. Spinal  anesthesia was performed and once adequate anesthesia was obtained, he was  placed in the lateral decubitus position. The left lower extremity was  prepped and draped in usual sterile fashion. Preoperative antibiotics  were administered. 1 gram of TXA was administered IV. At this point, a time-out was performed and all in attendance were in agreement. I marked out the planned surgical incision overlying the lateral  aspect of the left hip to perform a standard anterior lateral  approach to the left hip.   I then made an incision in that location. I  carried sharp dissection down to the level of the IT band. I then  incised longitudinally through the IT band. I then continued soft  tissue dissection through the gluteus medius layer to perform the standard anterior lateral approach to the left hip. I then placed Quentin retractors around the femoral neck and I  then continued dissection exposing the anterior hip capsule. I then  incised the hip capsule anteriorly exposing the femoral head. I then  had my assistant perform traction and external rotation as I dislocated the hip out of the acetabulum. I then marked out the planned femoral neck obliquely oriented from  the tip to greater trochanter to just proximal to the lesser trochanter. I then used a reciprocating saw to perform the femoral neck cut and  the femoral head was then extracted. I then turned my attention to preparation of the acetabulum. I placed a Quentin retractors  around the posterior and anterior acetabulum wall as well as spike  retractor in the superior acetabulum. I then debrided at the labrum  around the acetabulum. I then used a curate to débride out the  Ligamentum from the fovea. I then began reaming first with a size 43 mm reamer  and used this to medialize to the level of the medial wall. I then  began subsequently increasing reaming up to size of 53 reamer which fit snuggly within the acetabulum with excellent bleeding bed of bone. This was then selected  for the final implant component. I then inserted the final Biomet G7  size 54 acetabular shell, which was malleted in place until it was  firmly seated. I then drilled and placed 1 screw for additional fixation. The acetabulum was then irrigated with pulse lavage  using with a sterile normal saline with gentamicin. I then inserted  the neutral 36 mm liner which was maletted in place until it was firmly  seated. The spike retractor and Quentin retractors was then removed.     I then turned my attention to preparation of proximal femur. I had my assitant externally rotate and adduct the leg exposing the proximal femur. I then placed retractors along  the proximal femur for complete exposure. I then debrided of the  femoral neck as well as soft tissue around the tip of the greater  trochanter. I then inserted the canal finding reamer. I then used a  Rat tail rasp in the canal in the lateralized position. I then began broaching  first with a size 7 broach. I was able to broach up to a size of 11 which fit snuggly within the femoral canal down to the appropriate height. This  was then selected for the final implant component. I then inserted  the final Biomet Echo Micro size 11 press-fit femoral stem which was  maletted in place until it was firmly seated. I then inserted a trial size  36 -6 femoral head. I then had my assistant perform traction and  internal rotation. I then reduced the hip within the acetabulum. I  then had my assistant run the hip through a full range of motion and  found there to be excellent stability throughout range of motion with no evidence of impingement and equal leg lengths compared to contralateral side. This was then  selected for the final implant component. I then had my assistant  perform traction and external rotation as I dislocated the hip out of the  acetabulum. The trial head was then removed. I then irrigated the  acetabulum further with pulse lavage. I then inserted the final size  36 -6 ceramic femoral head, which was malleted in place. I then had  my assistant perform traction and internal rotation as I reduced  the hip within the acetabulum. My assistant then ran the hip through  a full range of motion and we found there to be excellent stability  throughout range of motion with no evidence of impingement and equal  leg lengths compared to contralateral side. The wound was then irrigated further with pulse lavage.   I then closed  the deep capsule layer using #5 Ethibond suture in a running fashion. I then closed the gluteus medius layer using #5 Ethibond suture in a  running fashion. I then closed the IT band using #1 Vicryl suture in  a figure-of-eight fashion. I then closed subcutaneous tissues using  2-0 Vicryl suture followed by skin closure with stratafix and Prineo. Adequate  hemostasis was maintained at all times. I then applied a sterile soft  dressing to the left lower extremity and the patient was then  awakened from anesthesia and transported to PACU in stable condition. He appeared to have tolerated the procedure well. PROGNOSIS:  At this point, the patient will begin physical therapy in the same day surgery area. Once the patient has passed with physical therapy they will be discharged home. They will continue in home physical therapy for gait training and can be  weightbearing as tolerated on the operative leg. The patient will receive prescriptions for pain medication as well as antibiotic  prophylaxis and DVT prophylaxis upon their discharge. Following their discharge, I will see the patient back in the office in 3 weeks and will continue to monitor their progress in the outpatient setting for resolution of their symptoms. Patient developed hypotension and bradycardia in PACU, cardiology was consulted for further evaluation treatment. They recommended admission overnight for continued monitoring of his discharge to home was held at this point.

## 2021-08-31 NOTE — PROGRESS NOTES
9589- pt arrived from OR, monitors applied. Report received from [facility] and [facility]. Respirations even and unlabored, left hip dressing dry and intact with ice packs applied. Pt able to follow commands and feel touch to knees. Dermatones at L3. Tolerating room air well. 0920- pt more awake and re-orients easily. Pt very pleasant and in disbelief that his surgery is already finished. Pt denies pain or nausea, ice chips given. Pt now able to wiggle toes on bilat feet. Continues to tolerate room air well.   0676- radiology here for post op hip xray  0930- pt became painful after positioning for xrays, pain medication given. 1606- pt HR now 39 and pt is nauseated. /65. Pt denies SOB.  updated, new orders received. 0955- Pt HR 34, pt is pale, diaphoretic and nauseated.  at bedside to evaluate. Pt had 50cc green emesis. Will conitnue to observe pt in PACU.   1005- Pt sleeping, HR 43 and /58. pts wife updated. 1030- BP 85/30 (55) pt is complaining of feeling dizzy and \"not right\" notified new orders received to give 500ml LR bolus. Bolus started. 1100- pt feeling bladder fullness but unable to void, pt straight cathed and 350cc yellow urine drained.  updated new orders received for EKG and cardiologist consult. 1138-  updated and at bedside to evaluate. Pts wife visiting and updated. 1230-  at bedside to evaluate, new orders received. Pt wife remains at bedside. 1245- atropine given IVP see eMAR.   1359- recovery complete, pt remains in PACU awaiting surgical bed. 1400- PT/OT at bedside to assist pt with ambulation with walker, pt tolerated well. 1437- pt sitting up in bed eating lunch. VSS  1600- pt sleeping, wife at bedside. VSS  1720- room ready, report called to [facility], preparing pt for transfer. 1800- pt to room 1129. Suze Sheets RN at bedside to assume primary care.

## 2021-08-31 NOTE — H&P
Subjective:      Patient ID: Nel Rogers is a 48 y.o. male.     Patient presents to the office today for left hip pain. Pt states pain today in the left hip is a 0/10 but at night pain is a 10/10 pt states pain has been going on for about 3 years. Pt states that most of his pain is in his left groin area. Pt states he is having a difficult time crossing his legs or going up and down the stairs. Pt states he does take advil daily to help with the pain for at least 8 hours. Pt states he has a difficult time sleeping at night do to the pain.     X-ray of the pelvis completed on 3/8/21  Impression  Moderate to severe osteoarthritis, slightly progressed since 2018.        He comes in today for his first visit with me in regards to his left hip pain. He states that over the past couple of years the pain and stiffness in his left hip has progressively worsened. He describes the pain as a deep and throbbing pain in his left hip and groin and he can now no longer crossing his legs or perform his daily activities due to the pain and stiffness. Patient denies any new injury to the involved extremity/ joint, denies numbness or tingling in the involved extremity and denies fever or chills.                 Review of Systems   Constitutional: Negative for activity change, chills and fever. Respiratory: Negative for chest tightness. Cardiovascular: Negative for chest pain. Musculoskeletal: Positive for arthralgias, gait problem and myalgias. Negative for back pain and joint swelling. Skin: Negative for color change, pallor, rash and wound. Neurological: Negative for weakness and numbness.         Past Medical History   No past medical history on file.        No current facility-administered medications on file prior to encounter.      Current Outpatient Medications on File Prior to Encounter   Medication Sig Dispense Refill    metFORMIN (GLUCOPHAGE) 1000 MG tablet TAKE 1 TABLET BY MOUTH TWICE DAILY WITH MEALS  atorvastatin (LIPITOR) 20 MG tablet TAKE 1 TABLET BY MOUTH ONCE DAILY FOR 90 DAYS      EQ ASPIRIN ADULT LOW DOSE 81 MG EC tablet TAKE 1 BY MOUTH ONCE DAILY      ibuprofen (ADVIL;MOTRIN) 200 MG tablet Take 200 mg by mouth every 6 hours as needed for Pain       No Known Allergies  Past Surgical History:   Procedure Laterality Date    TOE SURGERY      1 screw placed in left middle toe     Social History     Tobacco Use    Smoking status: Current Every Day Smoker     Packs/day: 0.50     Years: 30.00     Pack years: 15.00     Types: Cigarettes    Smokeless tobacco: Never Used   Vaping Use    Vaping Use: Never used   Substance Use Topics    Alcohol use: Yes     Comment: occasionally/socially    Drug use: Never     History reviewed. No pertinent family history. Objective:   Physical Exam  Constitutional:       Appearance: He is well-developed. HENT:      Head: Normocephalic. Eyes:      Pupils: Pupils are equal, round, and reactive to light. Pulmonary:      Effort: Pulmonary effort is normal.   Musculoskeletal:         General: Tenderness present. No deformity. Cervical back: Normal range of motion. Right hip: No deformity, lacerations, tenderness, bony tenderness or crepitus. Decreased range of motion. Normal strength. Left hip: Tenderness, bony tenderness and crepitus present. No deformity or lacerations. Decreased range of motion. Normal strength. Right knee: Normal.      Left knee: Normal.   Skin:     General: Skin is warm and dry. Capillary Refill: Capillary refill takes less than 2 seconds. Coloration: Skin is not pale. Findings: No erythema or rash. Neurological:      Mental Status: He is alert and oriented to person, place, and time. Left hip-Skin intact with no erythema, ecchymosis or lacerations present.   Moderate pain in the left groin with range of motion.      /64   Pulse 82   Temp 97.7 °F (36.5 °C) (Temporal)   Resp 18   SpO2 97%    Assessment:   Left hip DJD, severe                Plan:   I discussed with him today his x-ray findings. I explained to him that does have severe arthritis in the left hip. At this point given his persistent and worsening symptoms and with his x-ray findings I recommend surgical treatment. I had a lengthy discussion with him today in regards to left total hip replacement surgery. I explained risks, benefits, possible complications of the procedure and answered all questions for the patient. I explained postoperative rehabilitation protocol and expectations with the patient today. The patient understands and consents to the procedure. Patient will follow up with their primary care physician prior to surgical treatment for preoperative clearance. We will schedule surgery at soonest convenience. Continue weight-bearing as tolerated. Continue range of motion exercises as instructed. Ice and elevate as needed. Tylenol or Motrin for pain. Follow up in 3 weeks postop. He would like to do his surgery as an outpatient at [address] [place] [address] patient was counseled at length about the risks of bahman Covid-19 during their perioperative period and any recovery window from their procedure. The patient was made aware that bahman Covid-19  may worsen their prognosis for recovering from their procedure  and lend to a higher morbidity and/or mortality risk. All material risks, benefits, and reasonable alternatives including postponing the procedure were discussed. The patient does wish to proceed with the procedure at this time. Pt seen and examined, No change in H+P.     Mariluz Vargas, DO

## 2021-09-01 VITALS
OXYGEN SATURATION: 97 % | DIASTOLIC BLOOD PRESSURE: 76 MMHG | HEART RATE: 94 BPM | RESPIRATION RATE: 18 BRPM | SYSTOLIC BLOOD PRESSURE: 127 MMHG | TEMPERATURE: 98.4 F

## 2021-09-01 LAB
ANION GAP SERPL CALCULATED.3IONS-SCNC: 11 MMOL/L (ref 4–16)
BASOPHILS ABSOLUTE: 0 K/CU MM
BASOPHILS RELATIVE PERCENT: 0.3 % (ref 0–1)
BUN BLDV-MCNC: 11 MG/DL (ref 6–23)
CALCIUM SERPL-MCNC: 8.5 MG/DL (ref 8.3–10.6)
CHLORIDE BLD-SCNC: 104 MMOL/L (ref 99–110)
CO2: 24 MMOL/L (ref 21–32)
CREAT SERPL-MCNC: 0.7 MG/DL (ref 0.9–1.3)
DIFFERENTIAL TYPE: ABNORMAL
EOSINOPHILS ABSOLUTE: 0 K/CU MM
EOSINOPHILS RELATIVE PERCENT: 0.5 % (ref 0–3)
GFR AFRICAN AMERICAN: >60 ML/MIN/1.73M2
GFR NON-AFRICAN AMERICAN: >60 ML/MIN/1.73M2
GLUCOSE BLD-MCNC: 249 MG/DL (ref 70–99)
HCT VFR BLD CALC: 35 % (ref 42–52)
HEMOGLOBIN: 11.3 GM/DL (ref 13.5–18)
IMMATURE NEUTROPHIL %: 0.5 % (ref 0–0.43)
LYMPHOCYTES ABSOLUTE: 1 K/CU MM
LYMPHOCYTES RELATIVE PERCENT: 14.9 % (ref 24–44)
MCH RBC QN AUTO: 30.6 PG (ref 27–31)
MCHC RBC AUTO-ENTMCNC: 32.3 % (ref 32–36)
MCV RBC AUTO: 94.9 FL (ref 78–100)
MONOCYTES ABSOLUTE: 0.6 K/CU MM
MONOCYTES RELATIVE PERCENT: 9 % (ref 0–4)
NUCLEATED RBC %: 0 %
PDW BLD-RTO: 11.9 % (ref 11.7–14.9)
PLATELET # BLD: 127 K/CU MM (ref 140–440)
PMV BLD AUTO: 11.2 FL (ref 7.5–11.1)
POTASSIUM SERPL-SCNC: 4.1 MMOL/L (ref 3.5–5.1)
RBC # BLD: 3.69 M/CU MM (ref 4.6–6.2)
SEGMENTED NEUTROPHILS ABSOLUTE COUNT: 4.8 K/CU MM
SEGMENTED NEUTROPHILS RELATIVE PERCENT: 74.8 % (ref 36–66)
SODIUM BLD-SCNC: 139 MMOL/L (ref 135–145)
TOTAL IMMATURE NEUTOROPHIL: 0.03 K/CU MM
TOTAL NUCLEATED RBC: 0 K/CU MM
WBC # BLD: 6.5 K/CU MM (ref 4–10.5)

## 2021-09-01 PROCEDURE — 2580000003 HC RX 258: Performed by: ORTHOPAEDIC SURGERY

## 2021-09-01 PROCEDURE — 97530 THERAPEUTIC ACTIVITIES: CPT

## 2021-09-01 PROCEDURE — 80048 BASIC METABOLIC PNL TOTAL CA: CPT

## 2021-09-01 PROCEDURE — 97110 THERAPEUTIC EXERCISES: CPT

## 2021-09-01 PROCEDURE — 36415 COLL VENOUS BLD VENIPUNCTURE: CPT

## 2021-09-01 PROCEDURE — 6360000002 HC RX W HCPCS: Performed by: ORTHOPAEDIC SURGERY

## 2021-09-01 PROCEDURE — 97535 SELF CARE MNGMENT TRAINING: CPT

## 2021-09-01 PROCEDURE — 6370000000 HC RX 637 (ALT 250 FOR IP): Performed by: ORTHOPAEDIC SURGERY

## 2021-09-01 PROCEDURE — G0378 HOSPITAL OBSERVATION PER HR: HCPCS

## 2021-09-01 PROCEDURE — 97116 GAIT TRAINING THERAPY: CPT

## 2021-09-01 PROCEDURE — 6370000000 HC RX 637 (ALT 250 FOR IP): Performed by: INTERNAL MEDICINE

## 2021-09-01 PROCEDURE — 85025 COMPLETE CBC W/AUTO DIFF WBC: CPT

## 2021-09-01 RX ADMIN — ACETAMINOPHEN 650 MG: 325 TABLET ORAL at 01:37

## 2021-09-01 RX ADMIN — CEFAZOLIN SODIUM 2000 MG: 2 INJECTION, SOLUTION INTRAVENOUS at 01:37

## 2021-09-01 RX ADMIN — SODIUM CHLORIDE, POTASSIUM CHLORIDE, SODIUM LACTATE AND CALCIUM CHLORIDE: 600; 310; 30; 20 INJECTION, SOLUTION INTRAVENOUS at 01:37

## 2021-09-01 RX ADMIN — METFORMIN HYDROCHLORIDE 1000 MG: 500 TABLET ORAL at 10:32

## 2021-09-01 RX ADMIN — SODIUM CHLORIDE, PRESERVATIVE FREE 10 ML: 5 INJECTION INTRAVENOUS at 10:32

## 2021-09-01 RX ADMIN — DOCUSATE SODIUM AND SENNOSIDES 1 TABLET: 8.6; 5 TABLET, FILM COATED ORAL at 10:32

## 2021-09-01 RX ADMIN — ACETAMINOPHEN 650 MG: 325 TABLET ORAL at 05:59

## 2021-09-01 RX ADMIN — ASPIRIN 81 MG: 81 TABLET ORAL at 10:31

## 2021-09-01 RX ADMIN — ATORVASTATIN CALCIUM 20 MG: 20 TABLET, FILM COATED ORAL at 10:31

## 2021-09-01 ASSESSMENT — PAIN DESCRIPTION - LOCATION: LOCATION: HIP

## 2021-09-01 ASSESSMENT — PAIN DESCRIPTION - ONSET: ONSET: ON-GOING

## 2021-09-01 ASSESSMENT — PAIN - FUNCTIONAL ASSESSMENT: PAIN_FUNCTIONAL_ASSESSMENT: PREVENTS OR INTERFERES SOME ACTIVE ACTIVITIES AND ADLS

## 2021-09-01 ASSESSMENT — PAIN SCALES - GENERAL
PAINLEVEL_OUTOF10: 7
PAINLEVEL_OUTOF10: 5
PAINLEVEL_OUTOF10: 5

## 2021-09-01 ASSESSMENT — PAIN DESCRIPTION - PROGRESSION: CLINICAL_PROGRESSION: GRADUALLY IMPROVING

## 2021-09-01 ASSESSMENT — PAIN DESCRIPTION - PAIN TYPE: TYPE: SURGICAL PAIN

## 2021-09-01 ASSESSMENT — PAIN DESCRIPTION - ORIENTATION: ORIENTATION: LEFT

## 2021-09-01 ASSESSMENT — PAIN DESCRIPTION - FREQUENCY: FREQUENCY: CONTINUOUS

## 2021-09-01 NOTE — DISCHARGE SUMMARY
ADMIT DATE: 8/31/2021    DISCHARGE DATE: 9/1/2021    PROVIDER:     Gideon Pineda DO                      ADMISSION DIAGNOSIS:  Left hip DJD. DISCHARGE DIAGNOSIS:  Left hip DJD. PROCEDURE:  Left total hip arthroplasty on 8/31/2021. HOSPITAL COURSE:  The patient is a 48-year-old male with a  longstanding history of left hip pain. For this, he was brought to  [facility] on 8/31/2021 where he underwent left   total hip arthroplasty. He was admitted postoperatively  for pain control, rehabilitation, and medical monitoring. Hospitalist  was consulted for medical management. Physical Therapy was consulted  for gait training. He did receive antibiotic prophylaxis and DVT  prophylaxis during his hospitalization. Throughout his hospital  course, clinically, he remained stable with no apparent medical  complications. He was progressing well with physical therapy and his  pain was well controlled with oral medications.  was  consulted for discharge planning. Given that clinically he was  stable, he was discharged to home on 9/1/2021. DISCHARGE MEDICATIONS:    1. Oxycodone 5 mg one p.o. q.6 h. p.r.n. Pain. 2.  Xarelto 10 mg p.o. daily for 10 days. 3.  Other medications per the [facility] DISCHARGE INSTRUCTIONS:  He is to have physical therapy for gait  training and range of motion, and can be weightbearing as tolerated on  the leftt leg. His incision is to be kept clean, dry, and intact at  all times. He is to ice and elevate the left lower extremity for  pain and swelling. He is to contact my office if he develops  increased pain, swelling, numbness, tingling, redness, fevers, or  chills. He is to follow up in my office in 3 weeks at his  prescheduled appointment.            Mariluz Vargas DO

## 2021-09-01 NOTE — PROGRESS NOTES
(ROXICODONE) immediate release tablet 5 mg  5 mg Oral Q4H PRN Eli Ingrid, DO        Or    oxyCODONE (ROXICODONE) immediate release tablet 10 mg  10 mg Oral Q4H PRN Eli Ingrid, DO   10 mg at 08/31/21 1706    promethazine (PHENERGAN) tablet 12.5 mg  12.5 mg Oral Q6H PRN Eli Quintero, DO        Or    ondansetron [facility]) injection 4 mg  4 mg IntraVENous Q6H PRN Eli Quintero, DO        sennosides-docusate sodium (SENOKOT-S) 8.6-50 MG tablet 1 tablet  1 tablet Oral BID Eli Bidwell, DO   1 tablet at 09/01/21 1032    magnesium hydroxide (MILK OF MAGNESIA) 400 MG/5ML suspension 30 mL  30 mL Oral Daily PRN Eli Quintero, DO        rivaroxaban (XARELTO) tablet 10 mg  10 mg Oral Daily Eli Bidwell, DO   10 mg at 08/31/21 2205    ketorolac (TORADOL) injection 15 mg  15 mg IntraVENous Q6H PRN Eli Quintero, DO   15 mg at 08/31/21 1513    atorvastatin (LIPITOR) tablet 20 mg  20 mg Oral Daily Rosie Carson MD   20 mg at 09/01/21 1031    aspirin EC tablet 81 mg  81 mg Oral Daily Rosie Carson MD   81 mg at 09/01/21 1031    metFORMIN (GLUCOPHAGE) tablet 1,000 mg  1,000 mg Oral BID WC Rosie Carson MD   1,000 mg at 09/01/21 1032    glucose (GLUTOSE) 40 % oral gel 15 g  15 g Oral PRN Rosie Carson MD        dextrose 50 % IV solution  12.5 g IntraVENous PRN Rosie Carson MD        glucagon (rDNA) injection 1 mg  1 mg IntraMUSCular PRN Rosie Carson MD        dextrose 5 % solution  100 mL/hr IntraVENous PRN Rosie Carson MD               Labs:  CBC with Differential:    Lab Results   Component Value Date    WBC 6.5 09/01/2021    RBC 3.69 09/01/2021    HGB 11.3 09/01/2021    HCT 35.0 09/01/2021     09/01/2021    MCV 94.9 09/01/2021    MCH 30.6 09/01/2021    MCHC 32.3 09/01/2021    RDW 11.9 09/01/2021    SEGSPCT 74.8 09/01/2021    LYMPHOPCT 14.9 09/01/2021    MONOPCT 9.0 09/01/2021    BASOPCT 0.3 09/01/2021    MONOSABS 0.6 09/01/2021 LYMPHSABS 1.0 09/01/2021    EOSABS 0.0 09/01/2021    BASOSABS 0.0 09/01/2021    DIFFTYPE AUTOMATED DIFFERENTIAL 09/01/2021     CMP:    Lab Results   Component Value Date     08/25/2021    K 4.6 08/25/2021     08/25/2021    CO2 24 08/25/2021    BUN 17 08/25/2021    CREATININE 0.5 08/25/2021    GFRAA >60 08/25/2021    LABGLOM >60 08/25/2021    GLUCOSE 87 08/25/2021    PROT 6.9 08/25/2021    LABALBU 4.7 08/25/2021    CALCIUM 9.9 08/25/2021    BILITOT 0.3 08/25/2021    ALKPHOS 108 08/25/2021    AST <5 08/25/2021    ALT 7 08/25/2021     Hepatic Function Panel:    Lab Results   Component Value Date    ALKPHOS 108 08/25/2021    ALT 7 08/25/2021    AST <5 08/25/2021    PROT 6.9 08/25/2021    BILITOT 0.3 08/25/2021    LABALBU 4.7 08/25/2021     Magnesium:    Lab Results   Component Value Date    MG 1.8 08/31/2021     PT/INR:  No results found for: PROTIME, INR  Last 3 Troponin:  No results found for: TROPONINI  U/A:    Lab Results   Component Value Date    COLORU YELLOW 08/25/2021    WBCUA 2 08/25/2021    RBCUA <1 08/25/2021    MUCUS RARE 08/25/2021    TRICHOMONAS NONE SEEN 08/25/2021    BACTERIA NEGATIVE 08/25/2021    CLARITYU CLEAR 08/25/2021    SPECGRAV 1.015 08/25/2021    LEUKOCYTESUR NEGATIVE 08/25/2021    UROBILINOGEN NEGATIVE 08/25/2021    BILIRUBINUR NEGATIVE 08/25/2021    BLOODU NEGATIVE 08/25/2021     ABG:    Lab Results   Component Value Date    AQF0ITQ 34.0 08/31/2021    PO2ART 76 08/31/2021    VVU8MUW 22.6 08/31/2021     FLP:  No results found for: TRIG, HDL, LDLCALC, LDLDIRECT, LABVLDL  TSH:  No results found for: TSH   DATA:   ECG: Sinus Rhythm       ASSESSMENT:       1 bradycardia and hypotension possibly related to surgery, pain from spinal anesthesia   had a 2D echo done yesterday which showed normal LV function and wall motion EKG is unremarkable no acute ischemia  Patient not back to normal heart rate 75 beats a minute blood pressure is normal    2 study post left hip replacement  No

## 2021-09-01 NOTE — PROGRESS NOTES
Evangelist Simpson is a 48 y.o. male patient. 1. Status post left hip replacement      Past Medical History:   Diagnosis Date    Arthritis     Diabetes mellitus ([address]     Hyperlipidemia      Past Surgical History Pertinent Negatives:   Procedure Date Noted    ABDOMEN SURGERY 08/25/2021    APPENDECTOMY 08/25/2021    BACK SURGERY 08/25/2021    CARDIAC SURGERY 08/25/2021    CHOLECYSTECTOMY 08/25/2021    COLONOSCOPY 08/25/2021    COSMETIC SURGERY 08/25/2021    DILATATION, ESOPHAGUS 08/25/2021    ENDOSCOPY, COLON, DIAGNOSTIC 08/25/2021    EYE SURGERY 08/25/2021    FRACTURE SURGERY 08/25/2021    HERNIA REPAIR 08/25/2021    JOINT REPLACEMENT 08/25/2021    KIDNEY TRANSPLANT 08/25/2021    PACEMAKER PLACEMENT 08/25/2021    SKIN BIOPSY 08/25/2021    TONSILLECTOMY 08/25/2021    VASCULAR SURGERY 08/25/2021     Scheduled Meds:   sodium chloride flush  10 mL IntraVENous 2 times per day    acetaminophen  650 mg Oral Q6H    sennosides-docusate sodium  1 tablet Oral BID    rivaroxaban  10 mg Oral Daily    atorvastatin  20 mg Oral Daily    aspirin  81 mg Oral Daily    metFORMIN  1,000 mg Oral BID WC     Continuous Infusions:   lactated ringers 125 mL/hr at 09/01/21 0137    sodium chloride      dextrose       PRN Meds:sodium chloride flush, sodium chloride, oxyCODONE **OR** oxyCODONE, promethazine **OR** ondansetron, magnesium hydroxide, ketorolac, glucose, dextrose, glucagon (rDNA), dextrose    No Known Allergies  Active Problems:    Primary osteoarthritis of left hip    History of left hip replacement  Resolved Problems:    * No resolved hospital problems. *    Blood pressure 121/70, pulse 97, temperature 98.8 °F (37.1 °C), temperature source Oral, resp. rate 16, SpO2 95 %. Subjective   Patient seen and examined, resting in bed comfortably, pain controlled, no new complaints. Feeling better today.     Objective   LLE - Dressing removed, incision clean, dry, intact, no calf TTP, compartments soft, NVID  Mild pain with range of motion of the left hip. Assessment & Plan  POD #1 R PATRICIO, Doing well postoperatively    Remove Reyes catheter this morning. Continue weight bearing as tolerated. Continue range of motion exercises  Pain control  DVT prophylaxis  Continue PT/OT  Discharge home today.     Mariluz 97, DO  9/1/2021

## 2021-09-01 NOTE — PROGRESS NOTES
Occupational Therapy  Occupational Therapy Treatment Note      Name: Francheska Lora MRN: [id number] :   1965   Date:  2021   Admission Date: 2021 Room:  [address] [place] [address]-     Primary Problem: [place]:  The encounter diagnosis was Status post left hip replacement. Restrictions/Precautions:  Restrictions/Precautions  Restrictions/Precautions: General Precautions, Weight Bearing as tolerated, Anterior hip precautions LLE. Communication with other providers:  RN    Subjective:  Patient states:  Agreeable to therapy. Pain: Pt reported pain in L hip this date. Pt did not give numerical rating this date. Objective:    Observation:  Pt was received supine in bed. Objective Measures:  Vitals were stable throughout. Treatment, including education:  Self Care Training:   Cues were given for safety, sequence, UE/LE placement, visual cues, and balance. Activities performed today included oral hygiene, dressing and education on AE for ADLs. Therapeutic Activity Training:   Therapeutic activity training was instructed today. Cues were given for safety, sequence, UE/LE placement, awareness, and balance. Activities performed today included bed mobility training, sup-sit, sit-stand, ambulation. Pt was educated on leg  for bed mobility. Pt attempted to utilize leg  however was unable to successfully utilize AE for bed mobility. Pt completed supine to seated bed mobility with HOB slightly elevated and Abhijit for LE positioning and VC throughout for sequencing. Pt demo fair dynamic sitting balance with CGA. Pt educated on AE for LB dressing (sock aid and reacher). Pt appeared receptive. Pt doffed sock from R foot with SBA. Pt doffed sock from L foot utilizing reacher and SBA. Pt donned sock on R foot with SBA. Pt donned sock on L foot utilizing sock aid and SBA. Pt completed STS from EOB with RW and Abhijit for initiation and d/t weakness.  Pt demo fair dynamic standing balance with RW and CGA. Pt ambulated approx 15ft X 20ft X 15ft with RW and CGA. Pt required 2 standing rest breaks. Pt demo slow pace throughout and 0 LOB. Pt stood with RW at sink and completed oral hygiene with SBA. Pt ambulated 10ft to chair with RW and CGA. Pt completed stand-sit transfer to chair with RW and Abhijit for eccentric control. Pt was left seated in chair, all needs met, alarm in place. Assessment / Impression:    Patient's tolerance of treatment: good  Adverse Reaction: None  Significant change in status and impact:  None  Barriers to improvement: Activity tolerance, pain      Plan for Next Session:    Continue with POC    Time in:  729  Time out:  809  Timed treatment minutes:  40  Total treatment time:  40      Electronically signed by:    Tahir Mcneil,   9/1/2021, 8:11 AM     \"I, the qualified professional, was present and in the room for the entire session. The student participates in the delivery of services when the qualified practitioner is directing the service, making the skilled judgment, and is responsible for the assessment and treatment. \"

## 2021-09-01 NOTE — PROGRESS NOTES
Discharge instructions reviewed with patient. All questions answered. IV removed. Pt assisted with dressing for discharge. Pt assisted out to car.

## 2021-09-01 NOTE — PROGRESS NOTES
Physical Therapy    Physical Therapy Treatment Note  Name: Suzy Valladares MRN: [id number] :   1965   Date:  2021   Admission Date: 2021 Room:  [address] [place] [address]   Restrictions/Precautions:  Restrictions/Precautions  Restrictions/Precautions: General Precautions, Weight Bearing Lower Extremity Weight Bearing Restrictions  Left Lower Extremity Weight Bearing: Weight Bearing As Tolerated   Anterior Hip precautions  No hip extension, no hip adduction and no hip external rotation  Communication with other providers:  per nurse pt is ok to treat  Subjective:  Patient states:  He did not know being in the hospital was going to be so painful with removing the mcbirde, taking blood and his hip  Pain:   Location, Type, Intensity (0/10 to 10/10):  10/10 L hip with sit<>stand  Objective:    Observation:  Pt was sitting up in the chair  Treatment, including education/measures:  Pt given total hip booklet and reviewed precautions and safety  Supine Exercises: Ankle pumps x 10  Quad sets x 10  Glute sets x 10  Heel slides x 10  SAQ's x 10  Therapeutic Exercise:  Therapeutic exercises were instructed today. Cues were given for technique, safety, recruitment, and rationale. Cues were verbal and/or tactile. Transfers  Scooting :SBA  Sit to stand :min A and VC's with extra time  Stand to sit :min A for descend  Gait:  Pt amb with RW for 150 ft with SBA  Pt needed VC's for encouragement  Pt amb up and down 3 steps with B HR's and CGA using step to pattern  Safety  Patient left safely in the chair, with call light/phone in reach with alarm applied. Gait belt and mask were used for transfers and gait.   Assessment / Impression:     Patient's tolerance of treatment:  Fair d/t pain, mobility once standing was good   Adverse Reaction: none  Significant change in status and impact:  none  Barriers to improvement:  Pain with transitions  Plan for Next Session:    Will cont to work towards pt's goals per his tolerance  Time in:  0920  Time out:  1020  Timed treatment minutes:  60  Total treatment time:  61  Previously filed items:  Social/Functional History  Lives With: Spouse  Type of Home: House  Home Layout: Able to Live on Main level with bedroom/bathroom  Home Access: Stairs to enter with rails  Entrance Stairs - Number of Steps: 3  Bathroom Shower/Tub: Tub/Shower unit  Bathroom Toilet: Standard  ADL Assistance: Independent  Homemaking Assistance: Independent  Ambulation Assistance: Independent  Transfer Assistance: Independent  Active : Yes  Occupation: Full time employment  Type of occupation:      Long term goals  Time Frame for Long term goals : In one week:  Long term goal 1: Pt will complete all bed mobility with SBAx1  Long term goal 2: Pt will complete sit <> stand transfers with SBAx1  Long term goal 3: Pt will ambulate 100 feet with SBAx1 with LRAD  Long term goal 4: Pt will ascend/descend 6 steps with a handrail with minAx1  Long term goal 5: Pt will independently complete 3 sets of 10 reps of BLE AROM exercises in available and allowed ROM    Electronically signed by:     Tamanna Torres  9/1/2021, 10:17 AM

## 2021-09-01 NOTE — CARE COORDINATION
Chart reviewed and met w/ pt to initiate discharge planning. CM introduced self and explained role. Pt is from home w/ his wife and was independent PTA. Pt declined any DME in the home currently. Pt has PCP and insurance with affordable RX copays. Pt is planning to attend OP therapy and has an appointment scheduled for 9/7/21. Pt will need a rolling walker prior to discharge. CM contacted Yenny Fishman to arrange for walker delivery to hospital room prior to discharge. Pt declined any other needs from CM at this time. CM available should needs present. Benita Baker was evaluated today and a DME order was entered for a wheeled walker because he requires this to successfully complete daily living tasks of eating, bathing, toileting, personal cares, ambulating, grooming and hygiene. A wheeled walker is necessary due to the patient's unsteady gait, upper body weakness, and inability to  an ambulation device; and he can ambulate only by pushing a walker instead of a lesser assistive device such as a cane, crutch, or standard walker. The need for this equipment was discussed with the patient and he understands and is in agreement.     Electronically signed by Wendy Myles RN on 9/1/2021 at 9:06 AM

## 2021-09-07 ENCOUNTER — HOSPITAL ENCOUNTER (OUTPATIENT)
Dept: PHYSICAL THERAPY | Age: 56
Setting detail: THERAPIES SERIES
Discharge: HOME | End: 2021-09-07
Payer: COMMERCIAL

## 2021-09-07 PROCEDURE — 97116 GAIT TRAINING THERAPY: CPT

## 2021-09-07 PROCEDURE — 97164 PT RE-EVAL EST PLAN CARE: CPT

## 2021-09-07 PROCEDURE — 97110 THERAPEUTIC EXERCISES: CPT

## 2021-09-07 ASSESSMENT — PAIN DESCRIPTION - LOCATION: LOCATION: HIP

## 2021-09-07 ASSESSMENT — PAIN DESCRIPTION - PAIN TYPE: TYPE: SURGICAL PAIN

## 2021-09-07 ASSESSMENT — PAIN DESCRIPTION - PROGRESSION: CLINICAL_PROGRESSION: GRADUALLY IMPROVING

## 2021-09-07 ASSESSMENT — PAIN - FUNCTIONAL ASSESSMENT: PAIN_FUNCTIONAL_ASSESSMENT: PREVENTS OR INTERFERES SOME ACTIVE ACTIVITIES AND ADLS

## 2021-09-07 ASSESSMENT — PAIN DESCRIPTION - DESCRIPTORS: DESCRIPTORS: ACHING

## 2021-09-07 ASSESSMENT — PAIN DESCRIPTION - ORIENTATION: ORIENTATION: LEFT

## 2021-09-07 ASSESSMENT — PAIN SCALES - GENERAL: PAINLEVEL_OUTOF10: 5

## 2021-09-07 ASSESSMENT — PAIN DESCRIPTION - FREQUENCY: FREQUENCY: INTERMITTENT

## 2021-09-07 NOTE — PLAN OF CARE
Outpatient Physical Therapy        [] Phone: [phone]   Fax: [phone]   Pediatric Therapy          [] Phone: [phone]   Fax: [phone]  Pediatric [facility]          [] Phone: [phone]   Fax: [phone]      To: Referring Practitioner: Dr. Brigido Goldberg    From: Cha Shukla, PT  Patient: Carmenza Chatterjee       : 1965  Diagnosis: L PATRICIO  21   Treatment Diagnosis: L hip pain, weakness, dec ROM, gait dysfunction   Date: 2021    Physical Therapy Certification/Re-Certification Form  Dear Dr. Brigido Goldberg,  The following patient has been evaluated for physical therapy services and for therapy to continue, Please review the attached evaluation and/or summary of the patient's plan of care, and verify that you agree therapy should continue by signing the attached document and sending it back to our office. Assessment:  Pt reports a 2 year progressive increase in hip. Denies incident of onset. Taking tylenol to manage the pain. X-rays with severe OA. Decision to undergo surgery. DOS: 21. Lives with wife at home but taking couple of weeks off to assist pt needs. 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 Pt reports some issues since surgery:  \"I don't feel right. \"  Reports nausea and decreased hearing since surgery. Today, patient presents w/myofascial and surgical pain, gait dysfunction, impaired stregth/ROM, hip precautions and will benefit from physical therapy.     Planned Services:  [x] Therapeutic Exercise    [] Aquatics:  [x] Therapeutic Activity    [x] Ultrasound  [x] Elec Stimulation  [x] Gait Training     [] Cervical Traction [] Lumbar Traction  [x] Neuromuscular Re-education [x] Cold/hotpack [] Iontophoresis   [x] Instruction in HEP       [x] Manual Therapy     [] vasopneumatic            [x] Self care home management        [x]Dry needling trigger point point/pain management      Plan of Care Date Range:   21 - 10/07/21 ?    Frequency/Duration:  # Days per week: [] 1 day # Weeks: [] 1 week [] 5 weeks     [x] 2 days? [] 2 weeks [] 6 weeks     [] 3 days   [] 3 weeks [] 7 weeks     [] 4 days   [] 4 weeks [x] 8 weeks    Rehab Potential: [] Excellent [x] Good [] Fair  [] Poor     Goals:  Short term goals  Time Frame for Short term goals: In 4 weeks, patient will  Short term goal 1: demonstrate compliance and independence w/HEP. Short term goal 2: verbalize and demonstrate good understanding of hip precautions. Short term goal 3: perform TUG in <= 16 seconds, no AD  Short term goal 4: ambulate >= 500 ft in 6 min w/minimal discomfort. Short term goal 5: ascend/descend >= 6 stairs w/CGA, using (1) HR for light support safest method. Long term goals  Time Frame for Long term goals : In 8 weeks, patient will  Long term goal 1: demonstrate compliance and independence w/HEP. Long term goal 2: perform TUG in <= 12 seconds, no AD. Long term goal 3: ambulate >= 1,200 ft in 6 minutes, normalized gait improved safety and efficiency of gait in home and community. Long term goal 4: ascend/descend 11 stairs w/Mod Ind., using (1) HR for light support, reciprocal pattern for safety in home/community settings. Long term goal 5: score 0% disability on HOOS as indication of improved function w/daily activities. Electronically signed by:  Hipolito Cheney PT  9/7/2021, 7:08 PM          If you have any questions or concerns, please don't hesitate to call.   Thank you for your referral.    Physician Signature:_________________Date:____________Time: ________  By signing above, therapists plan is approved by physician

## 2021-09-07 NOTE — FLOWSHEET NOTE
Outpatient Physical Therapy  [facility]           [x] Phone: [phone]   Fax: [phone]  [facility]           [] Phone: [phone]   Fax: [phone]        Physical Therapy Daily Treatment Note  Date:  2021    Patient Name:  Deni Adame    :  1965  MRN: [id number]  Restrictions/Precautions:  L PATRICIO w/posterior precautions. Diagnosis:   Diagnosis: L PATRICIO  21  Date of Injury/Surgery: 21  Treatment Diagnosis: Treatment Diagnosis: L hip pain, weakness, dec ROM, gait dysfunction    Insurance/Certification information: PT Insurance Information: [facility]   Referring Physician:  Referring Practitioner: Dr. Donavon Mcdonald  Next Doctor Visit:  Meredith Matthews of care signed (Y/N): Pending   Outcome Measure:   HOOS  = 20% disability  TUG    Visit# / total visits:   2/  Pain level: 5/10     Goals:       Short term goals  Time Frame for Short term goals: In 4 weeks, patient will  Short term goal 1: demonstrate compliance and independence w/HEP. Short term goal 2: verbalize and demonstrate good understanding of hip precautions. Short term goal 3: perform TUG in <= 16 seconds, no AD  Short term goal 4: ambulate >= 500 ft in 6 min w/minimal discomfort. Short term goal 5: ascend/descend >= 6 stairs w/CGA, using (1) HR for light support safest method. Long term goals  Time Frame for Long term goals : In 8 weeks, patient will  Long term goal 1: demonstrate compliance and independence w/HEP. Long term goal 2: perform TUG in <= 12 seconds, no AD. Long term goal 3: ambulate >= 1,200 ft in 6 minutes, normalized gait improved safety and efficiency of gait in home and community. Long term goal 4: ascend/descend 11 stairs w/Mod Ind., using (1) HR for light support, reciprocal pattern for safety in home/community settings. Long term goal 5: score 0% disability on HOOS as indication of improved function w/daily activities.     Summary of Evaluation: Assessment: Pt reports a 2 year progressive increase in hip. Denies incident of onset. Taking tylenol to manage the pain. X-rays with severe OA. Decision to undergo surgery. DOS: 8/31/21. Lives with wife at home but taking couple of weeks off to assist pt needs. 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 Pt reports some issues since surgery:  \"I don't feel right. \"  Reports nausea and decreased hearing since surgery. Today, patient presents w/myofascial and surgical pain, gait dysfunction, impaired stregth/ROM, hip precautions and will benefit from physical therapy. Subjective:  See eval         Any changes in Ambulatory Summary Sheet? None        Objective:  See eval     Prior to today's treatment session, patient was screened for signs and symptoms related to COVID-19 including but not limited to verbally answering questions related to feeling ill, cough, or SOB, along with taking temperature via forehead thermometer. Patient presented with all negative signs and symptoms and had no fever >100 degrees Fahrenheit this date. Exercises: (No more than 4 columns)   Exercise/Equipment Date 9/07/21 #1 Date Date           WARM UP                     TABLE      Quad sets X 10 tc     SAQ X 10 aarom     Hip adductor smitha. w/ball X 10     Heel slides X 10 aarom     Heel digs X 10     SLR x10 aarom                    STANDING      Gait training Gait Training:  Cues were given for heel>toe, knee/hip flexion/extension during gait cycle. Pt no using AD and steady on level indoor surfaces. Recommend use of spc in community. PROPRIOCEPTION                                    MODALITIES                      Other Therapeutic Activities/Education:    POC and rationale/treatment progression expected reviewed w/patient and wife. Home Exercise Program:    See above. Handouts provided. Manual Treatments:    Retrograde massage from gastroc to thigh.   TrP release left rectus femoris and ITB w/good result  \"It feels better. \"    Modalities:    NO  Declined. May benefit from IFC stim and ice prn pain. Communication with other providers:    POC faxed 9/08/21    Assessment:  (Response towards treatment session) (Pain Rating)  End pain 5-6/10 after retrograde massage. Assessment: Pt reports a 2 year progressive increase in hip. Denies incident of onset. Taking tylenol to manage the pain. X-rays with severe OA. Decision to undergo surgery. DOS: 8/31/21. Lives with wife at home but taking couple of weeks off to assist pt needs. 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 Pt reports some issues since surgery:  \"I don't feel right. \"  Reports nausea and decreased hearing since surgery. Today, patient presents w/myofascial and surgical pain, gait dysfunction, impaired stregth/ROM, hip precautions and will benefit from physical therapy. Plan for Next Session: Specific instructions for Next Treatment: PATRICIO protocol, advance as tolerated.   STM left gastroc/quad/ITB (retrograde massage/TrP release quad/ITB), balance/proprioception      Time In / Time Out:     1303/1350    If BWC Please Indicate Time In/Out  CPT Code Time in Time out                                                              Timed Code/Total Treatment Minutes:  47'/47  Re-eval 15' (1), TE 22' (1), GT 10' (1)      Next Progress Note due:  10/07/21      Plan of Care Interventions:  [x] Therapeutic Exercise  [x] Modalities:  [x] Therapeutic Activity     [x] Ultrasound  [x] Estim  [x] Gait Training      [] Cervical Traction [] Lumbar Traction  [x] Neuromuscular Re-education    [x] Cold/hotpack [] Iontophoresis   [x] Instruction in HEP      [] Vasopneumatic   [x] Dry Needling    [x] Manual Therapy               [] Aquatic Therapy              Electronically signed by:  Hipolito Cheney PT, 9/7/2021, 7:09 PM

## 2021-09-07 NOTE — PROGRESS NOTES
Physical Therapy  Initial Assessment  Date: 2021  Patient Name: Nishant Reynolds  MRN: [id number]  : 1965     Treatment Diagnosis: L hip pain, weakness, dec ROM, gait dysfunction    Restrictions  Restrictions/Precautions  Restrictions/Precautions: Weight Bearing  Lower Extremity Weight Bearing Restrictions  Left Lower Extremity Weight Bearing: Weight Bearing As Tolerated  Position Activity Restriction  Hip Precautions: No hip flexion > 90 degrees, No ADduction, No hip internal rotation, Posterior hip precautions    Subjective   General  Chart Reviewed: Yes  Patient assessed for rehabilitation services?: Yes  Referring Practitioner: Dr. Alyce Almaguer  Referral Date : 21  Diagnosis: L APTRICIO  21  Follows Commands: Within Functional Limits  PT Visit Information  Onset Date: 21  PT Insurance Information: [facility]  Total # of Visits Approved: 61 ([facility] all therapist)  Subjective  Subjective: Pt reports a 2 year progressive increase in hip. Denies incident of onset. Taking tylenol to manage the pain. X-rays with severe OA. Decision to undergo surgery. DOS: 21. Lives with wife at home but taking couple of weeks off to assist pt needs. 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 Pt reports some issues since surgery:  \"I don't feel right. \"  Reports nausea and decreased hearing since surgery.   Pain Screening  Patient Currently in Pain: Yes  Pain Assessment  Pain Assessment: 0-10  Pain Level: 5  Patient's Stated Pain Goal: No pain  Pain Type: Surgical pain  Pain Location: Hip  Pain Orientation: Left  Pain Radiating Towards: None  Pain Descriptors: Aching  Pain Frequency: Intermittent  Clinical Progression: Gradually improving  Functional Pain Assessment: Prevents or interferes some active activities and ADLs  Vital Signs  Patient Currently in Pain: Yes    Vision/Hearing  Vision  Vision: Within Functional Limits  Hearing  Hearing: Within functional limits    Orientation  Orientation  Overall Orientation Status: Within Normal Limits    Social/Functional History  Social/Functional History  Lives With: Spouse  Home Layout: One level; Two level; Able to Live on Main level with bedroom/bathroom  Home Access: Stairs to enter with rails  Entrance Stairs - Number of Steps: 2  Bathroom Shower/Tub: Tub/Shower unit  Bathroom Toilet: Standard  ADL Assistance: Independent  Homemaking Assistance: Independent  Ambulation Assistance: Independent  Transfer Assistance: Independent  Active : Yes  Mode of Transportation: Car  Occupation: Full time employment  Type of occupation:   Leisure & Hobbies: Tractor pulls    Objective     Observation/Palpation  Palpation: TTP surgical leg quad and ITB  Observation: antalgic gait, no AD but steady  Edema: LLE post-surgical edema  Scar: posterolateral hip - no obvious signs of infection    AROM RLE (degrees)  RLE AROM: WNL  AROM LLE (degrees)  LLE AROM : Exceptions  L Hip Flexion 0-125: 0-80 deg  L Hip Extension 0-10: lacks 5 deg to neutral  L Hip ABduction 0-45: 0-25  L Hip ADduction 0-10: NT d/t hip prec. L Hip External Rotation 0-45: WFL  L Hip Internal Rotation 0-45: lacks 5 deg to neutral  L Knee Flexion 0-145: 0-110  L Knee Extension 0: 0    Strength RLE  Strength RLE: WNL  Comment: 5/5 in all directions.   Strength LLE  Strength LLE: Exception  L Hip Flexion: 2+/5 (momentum to raise LLE onto mat w/CGA therapist)  L Hip ABduction: 2+/5  L Hip ADduction: 3-/5 (to midline/neutral)  L Knee Flexion: 3+/5  L Knee Extension: 3+/5  L Ankle Dorsiflexion: 4-/5  Strength Other  Other: HOOS 20% disability        Sensation  Overall Sensation Status: WFL  Bed mobility  Supine to Sit: Contact guard assistance  Sit to Supine: Stand by assistance;Contact guard assistance  Transfers  Sit to Stand: Modified independent (vc hip precautions)  Stand to sit: Modified independent       Ambulation  Ambulation?: Yes  Ambulation 1  Surface: level tile;carpet  Device: No Device  Assistance: Independent  Quality of Gait: antalgic, wide ANDREAS, LLE outward turning, dec knee excursion/rigid extremity, dec heel>toe  Gait Deviations: Slow Kimber  Distance: 100 ft x 2                            Assessment   Conditions Requiring Skilled Therapeutic Intervention  Body structures, Functions, Activity limitations: Decreased functional mobility ; Decreased ROM; Decreased strength;Decreased endurance;Decreased ADL status  Assessment: Pt reports a 2 year progressive increase in hip. Denies incident of onset. Taking tylenol to manage the pain. X-rays with severe OA. Decision to undergo surgery. DOS: 8/31/21. Lives with wife at home but taking couple of weeks off to assist pt needs. 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 Pt reports some issues since surgery:  \"I don't feel right. \"  Reports nausea and decreased hearing since surgery. Today, patient presents w/myofascial and surgical pain, gait dysfunction, impaired stregth/ROM, hip precautions and will benefit from physical therapy. Treatment Diagnosis: L hip pain, weakness, dec ROM, gait dysfunction  Prognosis: Good  Decision Making: Low Complexity  History: PMH:  arthritis, DM, HLP  Exam: MMT, ROM, palpation, HOOS, gait  Clinical Presentation: Low complexity  Barriers to Learning: None  REQUIRES PT FOLLOW UP: Yes  Treatment Initiated : yes  Activity Tolerance  Activity Tolerance: Patient Tolerated treatment well;Patient limited by fatigue;Patient limited by pain; Patient limited by endurance  Activity Tolerance: mildly anxious today. Plan   Plan  Times per week: 2  Plan weeks: 8  Specific instructions for Next Treatment: PATRICIO protocol, advance as tolerated.   STM left gastroc/quad/ITB (retrograde massage/TrP release quad/ITB), balance/proprioception  Current Treatment Recommendations: Strengthening, ROM, Neuromuscular Re-education, Home Exercise Program, Manual Therapy - Soft Tissue Mobilization, Modalities, Gait Training, Balance Training, Pain Management, Integrated Dry Needling, Patient/Caregiver Education & Training    G-Code       OutComes Score                                                  AM-PAC Score             Goals  Short term goals  Time Frame for Short term goals: In 4 weeks, patient will  Short term goal 1: demonstrate compliance and independence w/HEP. Short term goal 2: verbalize and demonstrate good understanding of hip precautions. Short term goal 3: perform TUG in <= 16 seconds, no AD  Short term goal 4: ambulate >= 500 ft in 6 min w/minimal discomfort. Short term goal 5: ascend/descend >= 6 stairs w/CGA, using (1) HR for light support safest method. Long term goals  Time Frame for Long term goals : In 8 weeks, patient will  Long term goal 1: demonstrate compliance and independence w/HEP. Long term goal 2: perform TUG in <= 12 seconds, no AD. Long term goal 3: ambulate >= 1,200 ft in 6 minutes, normalized gait improved safety and efficiency of gait in home and community. Long term goal 4: ascend/descend 11 stairs w/Mod Ind., using (1) HR for light support, reciprocal pattern for safety in home/community settings. Long term goal 5: score 0% disability on HOOS as indication of improved function w/daily activities.   Patient Goals   Patient goals : return to work w/o restrictions and pain       Therapy Time   Individual Concurrent Group Co-treatment   Time In 1303         Time Out 1350         Minutes 47         Timed Code Treatment Minutes: [address], PT

## 2021-09-09 ENCOUNTER — HOSPITAL ENCOUNTER (OUTPATIENT)
Dept: PHYSICAL THERAPY | Age: 56
Setting detail: THERAPIES SERIES
Discharge: HOME | End: 2021-09-09
Payer: COMMERCIAL

## 2021-09-09 PROCEDURE — 97116 GAIT TRAINING THERAPY: CPT

## 2021-09-09 PROCEDURE — 97110 THERAPEUTIC EXERCISES: CPT

## 2021-09-09 NOTE — FLOWSHEET NOTE
Outpatient Physical Therapy  [place]           [x] Phone: [phone]   Fax: [phone]  [facility]           [] Phone: [phone]   Fax: [phone]        Physical Therapy Daily Treatment Note  Date:  2021    Patient Name:  Antonieta Santana    :  1965  MRN: [id number]  Restrictions/Precautions:  L PATRICIO w/posterior precautions. Diagnosis:   Diagnosis: L PATRICIO  21  Date of Injury/Surgery: 21  Treatment Diagnosis: Treatment Diagnosis: L hip pain, weakness, dec ROM, gait dysfunction    Insurance/Certification information: PT Insurance Information: [facility]   Referring Physician:  Referring Practitioner: Dr. Vic Schwab  Next Doctor Visit:  Shanita Skelton of care signed (Y/N): Pending resent   Outcome Measure:   HOOS  = 20% disability  TUG  Visit# / total visits:     Pain level: 5/10     Goals:       Short term goals  Time Frame for Short term goals: In 4 weeks, patient will  Short term goal 1: demonstrate compliance and independence w/HEP. Short term goal 2: verbalize and demonstrate good understanding of hip precautions. Short term goal 3: perform TUG in <= 16 seconds, no AD  Short term goal 4: ambulate >= 500 ft in 6 min w/minimal discomfort. Short term goal 5: ascend/descend >= 6 stairs w/CGA, using (1) HR for light support safest method. Long term goals  Time Frame for Long term goals : In 8 weeks, patient will  Long term goal 1: demonstrate compliance and independence w/HEP. Long term goal 2: perform TUG in <= 12 seconds, no AD. Long term goal 3: ambulate >= 1,200 ft in 6 minutes, normalized gait improved safety and efficiency of gait in home and community. Long term goal 4: ascend/descend 11 stairs w/Mod Ind., using (1) HR for light support, reciprocal pattern for safety in home/community settings. Long term goal 5: score 0% disability on HOOS as indication of improved function w/daily activities.     Summary of Evaluation: Assessment: Pt reports a 2 year progressive increase in hip. Denies incident of onset. Taking tylenol to manage the pain. X-rays with severe OA. Decision to undergo surgery. DOS: 8/31/21. Lives with wife at home but taking couple of weeks off to assist pt needs. 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 Pt reports some issues since surgery:  \"I don't feel right. \"  Reports nausea and decreased hearing since surgery. Today, patient presents w/myofascial and surgical pain, gait dysfunction, impaired stregth/ROM, hip precautions and will benefit from physical therapy. Subjective:  Fer Zepeda arrives to therapy stating that the spinal anesthesia is messing him up. Ears are ringing, feeling nauseas, neck pain, lower back pain. Denies headache. He denies any pain in the hip but is having trouble getting the muscles to work. Any changes in Ambulatory Summary Sheet? None        Objective:  COVID screening questions were asked and patient attested that there had been no contact or symptoms    /68 O2 98% HR 64 bpm -- irregular pulse   Requires assistance to raise leg onto table when going from sit-supine position. Reported light headed feeling during supine exercises. Keeps leg in externally rotated position. Frequent reminders to don mask throughout session. Using cane on wrong side - training provided for this. Exercises: (No more than 4 columns)   Exercise/Equipment Date 9/07/21 #1 9/9/2021 #2 Date           WARM UP                     TABLE      Quad sets X 10 tc 2*10     SAQ X 10 aarom 2*10    Hip adductor smitha. w/ball X 10 2*10    Heel slides X 10 aarom 2*10    Heel digs X 10 2*10    SLR x10 aarom 10*                   STANDING      Gait training Gait Training:  Cues were given for heel>toe, knee/hip flexion/extension during gait cycle. Pt no using AD and steady on level indoor surfaces. Recommend use of spc in community.      Gait training with Issac Esteban MODALITIES                      Other Therapeutic Activities/Education:  Advised patient and spouse to contact doc regarding irregular and low HR. Home Exercise Program:        Manual Treatments:    Retrograde massage from gastroc to thigh. TrP release left rectus femoris and ITB w/good result  \"It feels better. \"    Modalities:    NO  Declined. May benefit from IFC stim and ice prn pain. Communication with other providers:  Consulted with Dania Ortiz PT regarding irregular heart rate and rhythm. POC faxed 9/08/21    Assessment: Mercedes Jules tolerated today's session well. He was reporting some symptoms that he believes are a result of the spinal block. His HR was lower than typical for his age/gender and fitness level and he demonstrated an irregular heart rhythm. We focused on table activities this date secondary to patients symptoms and concerns with heart rate. End session pain:     Plan for Next Session: Specific instructions for Next Treatment: PATRICIO protocol, advance as tolerated.   STM left gastroc/quad/ITB (retrograde massage/TrP release quad/ITB), balance/proprioception      Time In / Time Out:     1105/1150      Timed Code/Total Treatment Minutes:  39' 1 gait 8' 2 TE 37'      Next Progress Note due:  10/07/21      Plan of Care Interventions:  [x] Therapeutic Exercise  [x] Modalities:  [x] Therapeutic Activity     [x] Ultrasound  [x] Estim  [x] Gait Training      [] Cervical Traction [] Lumbar Traction  [x] Neuromuscular Re-education    [x] Cold/hotpack [] Iontophoresis   [x] Instruction in HEP      [] Vasopneumatic   [x] Dry Needling    [x] Manual Therapy               [] Aquatic Therapy              Electronically signed by:  Wilver Frederick     9/9/2021, 8:36 AM

## 2021-09-13 ENCOUNTER — HOSPITAL ENCOUNTER (OUTPATIENT)
Dept: PHYSICAL THERAPY | Age: 56
Setting detail: THERAPIES SERIES
Discharge: HOME | End: 2021-09-13
Payer: COMMERCIAL

## 2021-09-13 PROCEDURE — 97110 THERAPEUTIC EXERCISES: CPT

## 2021-09-13 PROCEDURE — 97140 MANUAL THERAPY 1/> REGIONS: CPT

## 2021-09-13 NOTE — FLOWSHEET NOTE
Outpatient Physical Therapy  [place]           [x] Phone: [phone]   Fax: [phone]  Lynne Gorman           [] Phone: [phone]   Fax: [phone]        Physical Therapy Daily Treatment Note  Date:  2021    Patient Name:  Ankur Manzanares    :  1965  MRN: [id number]  Restrictions/Precautions:  L PATRICIO w/posterior precautions. Diagnosis:   Diagnosis: L PATRICIO  21  Date of Injury/Surgery: 21  Treatment Diagnosis: Treatment Diagnosis: L hip pain, weakness, dec ROM, gait dysfunction    Insurance/Certification information: PT Insurance Information: [facility]   Referring Physician:  Referring Practitioner: Dr. Jackie Pryor  Next Doctor Visit:  Darby Hutchison of care signed (Y/N): Pending resent   Outcome Measure:   HOOS  = 20% disability  TUG  Visit# / total visits:   3/16  Pain level: 0/10     Goals:       Short term goals  Time Frame for Short term goals: In 4 weeks, patient will  Short term goal 1: demonstrate compliance and independence w/HEP. Short term goal 2: verbalize and demonstrate good understanding of hip precautions. Short term goal 3: perform TUG in <= 16 seconds, no AD  Short term goal 4: ambulate >= 500 ft in 6 min w/minimal discomfort. Short term goal 5: ascend/descend >= 6 stairs w/CGA, using (1) HR for light support safest method. Long term goals  Time Frame for Long term goals : In 8 weeks, patient will  Long term goal 1: demonstrate compliance and independence w/HEP. Long term goal 2: perform TUG in <= 12 seconds, no AD. Long term goal 3: ambulate >= 1,200 ft in 6 minutes, normalized gait improved safety and efficiency of gait in home and community. Long term goal 4: ascend/descend 11 stairs w/Mod Ind., using (1) HR for light support, reciprocal pattern for safety in home/community settings. Long term goal 5: score 0% disability on HOOS as indication of improved function w/daily activities.     Summary of Evaluation: Assessment: Pt reports a 2 year progressive

## 2021-09-17 ENCOUNTER — TELEPHONE (OUTPATIENT)
Dept: ORTHOPEDIC SURGERY | Age: 56
End: 2021-09-17

## 2021-09-17 ENCOUNTER — HOSPITAL ENCOUNTER (OUTPATIENT)
Dept: PHYSICAL THERAPY | Age: 56
Setting detail: THERAPIES SERIES
Discharge: HOME | End: 2021-09-17
Payer: COMMERCIAL

## 2021-09-17 PROCEDURE — 97110 THERAPEUTIC EXERCISES: CPT

## 2021-09-17 NOTE — TELEPHONE ENCOUNTER
Patient stopped in to the office today for a wound check of the left PATRICIO. Pt states he started to notice drainage coming from his incision on Tuesday patient states he has not have had any drainage since Tuesday. Pt denies any pain in the hip. incision looks good and is closed.  I did place a dressing on the distal part of his incision to be safe incase he has any additional drainage

## 2021-09-17 NOTE — FLOWSHEET NOTE
progressive increase in hip. Denies incident of onset. Taking tylenol to manage the pain. X-rays with severe OA. Decision to undergo surgery. DOS: 8/31/21. Lives with wife at home but taking couple of weeks off to assist pt needs. 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 Pt reports some issues since surgery:  \"I don't feel right. \"  Reports nausea and decreased hearing since surgery. Today, patient presents w/myofascial and surgical pain, gait dysfunction, impaired stregth/ROM, hip precautions and will benefit from physical therapy. Subjective: Ana Atkins arrives to therapy stating that the hip is okay. No pain currently and is able to raise the leg up onto and off of the bed without assist now. Incision came open Wednesday this week and it bled a lot. They had to change the bandage multiple times before it stopped. Called doc office and didn't receive a call back. Any changes in Ambulatory Summary Sheet? None        Objective:  COVID screening questions were asked and patient attested that there had been no contact or symptoms    Good quad set. Only able to verbalize 1/3 precautions, reviewed with patient. Able to verbalize all 3 by end of session. Exercises: (No more than 4 columns)   Exercise/Equipment 9/9/2021 #2 Date 9/13/21 #3 9/17/2021 #4           WARM UP                     TABLE      Quad sets 2*10  3ct hold 2x10 2*10 3\"   SAQ 2*10 2x10 2# 2*10   Hip adductor smitha.  w/ball 2*10 2x10 2*10   Heel slides 2*10 2x10 2*10 with slide board    Heel digs 2*10 2x10 --   SLR 10* 10x 2*10   Supine hip abd  10x with verbal and tactile cueing to limit hip ER 2*10 with slide board    Sit-Stand    2*10                  STANDING      Gait training Gait training with [facility]   -   Standing marches  10x with light UE support 2*20 steps no UE support                                       PROPRIOCEPTION                                    MODALITIES                      Other Therapeutic Activities/Education:      Home Exercise Program:        Manual Treatments:    Retrograde massage from gastroc to thigh. Rolling to left quad    Modalities:    NO  Declined. Communication with other providers:  Consulted with Mattie Gurrola PT regarding irregular heart rate and rhythm. POC faxed 9/08/21    Assessment: Eliz Valerio tolerated today's session well. He is progressing nicely with ability to decrease to less restrictive device for walking, improved strength for lifting the L LE and improved peewee n levels/tolerance for exercise in the clinic. He appears ready to progress towards more functional/weight bearing exercises. End session pain: 0/10    Plan for Next Session: Specific instructions for Next Treatment: PATRICIO protocol, advance as tolerated.   STM left gastroc/quad/ITB (retrograde massage/TrP release quad/ITB), balance/proprioception      Time In / Time Out:  1105/1145       Timed Code/Total Treatment Minutes: 36' 3 TE       Next Progress Note due:  10/07/21      Plan of Care Interventions:  [x] Therapeutic Exercise  [x] Modalities:  [x] Therapeutic Activity     [x] Ultrasound  [x] Estim  [x] Gait Training      [] Cervical Traction [] Lumbar Traction  [x] Neuromuscular Re-education    [x] Cold/hotpack [] Iontophoresis   [x] Instruction in HEP      [] Vasopneumatic   [x] Dry Needling    [x] Manual Therapy               [] Aquatic Therapy              Electronically signed by:  Neel Reaves         9/17/2021, 7:07 AM

## 2021-09-20 ENCOUNTER — OFFICE VISIT (OUTPATIENT)
Dept: ORTHOPEDIC SURGERY | Age: 56
End: 2021-09-20

## 2021-09-20 ENCOUNTER — HOSPITAL ENCOUNTER (OUTPATIENT)
Dept: PHYSICAL THERAPY | Age: 56
Setting detail: THERAPIES SERIES
Discharge: HOME | End: 2021-09-20
Payer: COMMERCIAL

## 2021-09-20 VITALS — WEIGHT: 206 LBS | RESPIRATION RATE: 15 BRPM | HEIGHT: 71 IN | BODY MASS INDEX: 28.84 KG/M2

## 2021-09-20 DIAGNOSIS — Z96.642 STATUS POST TOTAL REPLACEMENT OF LEFT HIP: Primary | ICD-10-CM

## 2021-09-20 DIAGNOSIS — Z09 POSTOP CHECK: ICD-10-CM

## 2021-09-20 PROCEDURE — 97530 THERAPEUTIC ACTIVITIES: CPT

## 2021-09-20 PROCEDURE — 97110 THERAPEUTIC EXERCISES: CPT

## 2021-09-20 PROCEDURE — 99024 POSTOP FOLLOW-UP VISIT: CPT | Performed by: ORTHOPAEDIC SURGERY

## 2021-09-20 NOTE — FLOWSHEET NOTE
Outpatient Physical Therapy  [place]           [x] Phone: [phone]   Fax: [phone]  Tonya Perry           [] Phone: [phone]   Fax: [phone]        Physical Therapy Daily Treatment Note  Date:  2021    Patient Name:  Fina Melendez    :  1965  MRN: [id number]  Restrictions/Precautions:  L PATRICIO w/posterior precautions. Diagnosis:   Diagnosis: L PATRICIO  21  Date of Injury/Surgery: 21  Treatment Diagnosis: Treatment Diagnosis: L hip pain, weakness, dec ROM, gait dysfunction    Insurance/Certification information: PT Insurance Information: [facility]   Referring Physician:  Referring Practitioner: Dr. Miriam Lazcano  Next Doctor Visit:  Elis Lundy of care signed (Y/N): Y  Outcome Measure:   HOOS  = 20% disability  TUG  Visit# / total visits:     Pain level: 0/10     Goals:       Short term goals  Time Frame for Short term goals: In 4 weeks, patient will  Short term goal 1: demonstrate compliance and independence w/HEP. Reports compliance   Short term goal 2: verbalize and demonstrate good understanding of hip precautions. Short term goal 3: perform TUG in <= 16 seconds, no AD  Short term goal 4: ambulate >= 500 ft in 6 min w/minimal discomfort. Short term goal 5: ascend/descend >= 6 stairs w/CGA, using (1) HR for light support safest method. Long term goals  Time Frame for Long term goals : In 8 weeks, patient will  Long term goal 1: demonstrate compliance and independence w/HEP. Long term goal 2: perform TUG in <= 12 seconds, no AD. Long term goal 3: ambulate >= 1,200 ft in 6 minutes, normalized gait improved safety and efficiency of gait in home and community. Long term goal 4: ascend/descend 11 stairs w/Mod Ind., using (1) HR for light support, reciprocal pattern for safety in home/community settings. Long term goal 5: score 0% disability on HOOS as indication of improved function w/daily activities.     Summary of Evaluation: Assessment: Pt reports a 2 year progressive increase in hip. Denies incident of onset. Taking tylenol to manage the pain. X-rays with severe OA. Decision to undergo surgery. DOS: 8/31/21. Lives with wife at home but taking couple of weeks off to assist pt needs. 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 Pt reports some issues since surgery:  \"I don't feel right. \"  Reports nausea and decreased hearing since surgery. Today, patient presents w/myofascial and surgical pain, gait dysfunction, impaired stregth/ROM, hip precautions and will benefit from physical therapy. Subjective: Mandi Serrano states he over did it on Sat with walking at the [employer]. Did not have increase pain but was very fatigued. Is scheduled to see surgeon right after today's session. Any changes in Ambulatory Summary Sheet? None        Objective:  COVID screening questions were asked and patient attested that there had been no contact or symptoms    Good quad set. Attempted S/L hip abd with pillows between knees but still too difficult, standing tolerated well. Ambulating in his home w/o st cane, outside of his home with st cane. Exercises: (No more than 4 columns)   Exercise/Equipment 9/9/2021 #2 Date 9/13/21 #3 9/17/2021 #4 9/20/21 #5            WARM UP                        TABLE       Quad sets 2*10  3ct hold 2x10 2*10 3\" 2x10 3\"   SAQ 2*10 2x10 2# 2*10 2# 2*10   Hip adductor smitha.  w/ball 2*10 2x10 2*10 2x10   Heel slides 2*10 2x10 2*10 with slide board  2*10 with slide board   Heel digs 2*10 2x10 --    SLR 10* 10x 2*10 10x - did have some groin pain today   Supine hip abd  10x with verbal and tactile cueing to limit hip ER 2*10 with slide board  2*10 with slide board   Sit-Stand    2*10 2x10                    STANDING       Gait training Gait training with [facility]   -    Standing marches  10x with light UE support 2*20 steps no UE support  2x10 w/o UE support   Hip abd    1x10 R/L   Step ups    6 in 10x L PROPRIOCEPTION                                          MODALITIES                         Other Therapeutic Activities/Education:      Home Exercise Program:        Manual Treatments:      Rolling to left quad    Modalities:    NO  Declined. Communication with other providers:  Consulted with Leanna Gross PT regarding irregular heart rate and rhythm. POC faxed 9/08/21    Assessment: Aleta Beavers tolerated today's session well. Continuing to show progress towards goals. Continue progressing with more functional/weight bearing exercises. End session pain: 0/10    Plan for Next Session: Specific instructions for Next Treatment: PATRICIO protocol, advance as tolerated.   STM left gastroc/quad/ITB (retrograde massage/TrP release quad/ITB), balance/proprioception      Time In / Time Out:  1347/1427       Timed Code/Total Treatment Minutes: 40'/40', (2) TE, (1) TA       Next Progress Note due:  10/07/21      Plan of Care Interventions:  [x] Therapeutic Exercise  [x] Modalities:  [x] Therapeutic Activity     [x] Ultrasound  [x] Estim  [x] Gait Training      [] Cervical Traction [] Lumbar Traction  [x] Neuromuscular Re-education    [x] Cold/hotpack [] Iontophoresis   [x] Instruction in HEP      [] Vasopneumatic   [x] Dry Needling    [x] Manual Therapy               [] Aquatic Therapy              Electronically signed by:  Talha Lam         9/20/2021, 1:49 PM

## 2021-09-20 NOTE — PATIENT INSTRUCTIONS
Continue weight-bearing as tolerated. Continue range of motion exercises as instructed. Ice and elevate as needed. Tylenol or Motrin for pain.   Follow up in 3 weeks  Continue with physical therapy

## 2021-09-20 NOTE — PROGRESS NOTES
Patient returns to the office today for s/p of the left PATRICIO DOS 8/31/21. Pt states pain today is a 3/10.  Pt states he has been doing well with his recovery

## 2021-09-21 ASSESSMENT — ENCOUNTER SYMPTOMS
COLOR CHANGE: 0
BACK PAIN: 0
CHEST TIGHTNESS: 0

## 2021-09-21 NOTE — PROGRESS NOTES
Subjective:      Patient ID: Yonathan Abad is a 48 y.o. male. Patient returns to the office today for s/p of the left PATRICIO DOS 8/31/21. Pt states pain today is a 3/10. Pt states he has been doing well with his recovery     He comes in today for his 3-week postop recheck. Overall he states that he is feeling great and is not having much pain in the left hip. Patient denies any new injury to the involved extremity/ joint, denies numbness or tingling in the involved extremity and denies fever or chills. Review of Systems   Constitutional: Negative for activity change, chills and fever. Respiratory: Negative for chest tightness. Cardiovascular: Negative for chest pain. Musculoskeletal: Positive for joint swelling. Negative for arthralgias, back pain, gait problem and myalgias. Skin: Negative for color change, pallor, rash and wound. Neurological: Negative for weakness and numbness. Past Medical History:   Diagnosis Date    Arthritis     Diabetes mellitus ([facility] [address]     Hyperlipidemia        Objective:   Physical Exam  Constitutional:       Appearance: He is well-developed. HENT:      Head: Normocephalic. Eyes:      Pupils: Pupils are equal, round, and reactive to light. Pulmonary:      Effort: Pulmonary effort is normal.   Musculoskeletal:         General: Swelling present. No tenderness or deformity. Normal range of motion. Cervical back: Normal range of motion. Right hip: No deformity, lacerations, tenderness, bony tenderness or crepitus. Normal strength. Left hip: No deformity, lacerations, tenderness, bony tenderness or crepitus. Normal range of motion. Normal strength. Right knee: Normal.      Left knee: Normal.   Skin:     General: Skin is warm and dry. Capillary Refill: Capillary refill takes less than 2 seconds. Coloration: Skin is not pale. Findings: No erythema or rash.    Neurological:      Mental Status: He is alert and oriented to person, place, and time. Left hip-Incision clean, dry, intact, with no erythema, no drainage, and no signs of infection. No groin pain with range of motion of the left hip. XR HIP 1 VW W PELVIS LEFT    Result Date: 9/20/2021  XRAY X-ray 2 views of the left hip and AP pelvis obtained and reviewed by me today in the office demonstrates age appropriate bone density throughout with well-positioned left total hip arthroplasty, there has been no change in position of components compared to prior x-rays, no subluxation or dislocation noted, no acute osseous abnormalities. Impression: Stable left total hip arthroplasty with no acute process. Assessment:      Left PATRICIO, 3 weeks      Plan:      I discussed with him today his x-ray findings. I explained to him that his implants are stable and remain in good alignment. I discussed with him today that he is progressing extremely well. I discussed with the patient today antibiotic prophylaxis for procedures. Continue weight-bearing as tolerated. Continue range of motion exercises as instructed. Ice and elevate as needed. Tylenol or Motrin for pain. Follow up in 3 weeks for recheck with x-rays of left hip.           Mariluz Vargas, DO

## 2021-09-24 ENCOUNTER — HOSPITAL ENCOUNTER (OUTPATIENT)
Dept: PHYSICAL THERAPY | Age: 56
Setting detail: THERAPIES SERIES
Discharge: HOME | End: 2021-09-24
Payer: COMMERCIAL

## 2021-09-24 PROCEDURE — 97116 GAIT TRAINING THERAPY: CPT

## 2021-09-24 PROCEDURE — 97112 NEUROMUSCULAR REEDUCATION: CPT

## 2021-09-24 PROCEDURE — 97110 THERAPEUTIC EXERCISES: CPT

## 2021-09-24 NOTE — FLOWSHEET NOTE
year progressive increase in hip. Denies incident of onset. Taking tylenol to manage the pain. X-rays with severe OA. Decision to undergo surgery. DOS: 8/31/21. Lives with wife at home but taking couple of weeks off to assist pt needs. 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 Pt reports some issues since surgery:  \"I don't feel right. \"  Reports nausea and decreased hearing since surgery. Today, patient presents w/myofascial and surgical pain, gait dysfunction, impaired stregth/ROM, hip precautions and will benefit from physical therapy. Subjective:   No pain today. Ambulating w/spc in community; no AD in home. No falls. Any changes in Ambulatory Summary Sheet? None        Objective:  COVID screening questions were asked and patient attested that there had been no contact or symptoms    G(-) balance w/challanged balance/gait. Supervision (1) flight of stairs, (1) HR, modified reciprocal w/break on ea step. LLE Eccentric quad weakness  LLE Tight heel cord             Exercises: (No more than 4 columns)   Exercise/Equipment 9/9/2021 #2 Date 9/13/21 #3 9/17/2021 #4 9/20/21 #5 9/24/21 #6           Gait belt utilized on stairs TUG today   WARM UP                              TABLE         Quad sets 2*10  3ct hold 2x10 2*10 3\" 2x10 3\" 2x10 3\"    SAQ 2*10 2x10 2# 2*10 2# 2*10 2# 2*10    Hip adductor smitha.  w/ball 2*10 2x10 2*10 2x10 2x10    Heel slides 2*10 2x10 2*10 with slide board  2*10 with slide board 2*10 w/slide board    Heel digs 2*10 2x10 --      SLR 10* 10x 2*10 10x - did have some groin pain today 10x - did have some temporary groin pain (hip flexor m.) w/SLR ad heel slides    Supine hip abd  10x with verbal and tactile cueing to limit hip ER 2*10 with slide board  2*10 with slide board 2*10 with slide board    Sit-Stand    2*10 2x10 2 x 10                         STANDING         Gait training Gait training with [facility]   -  Floor ladder  Reciprocal  4 laps  Ind./no LOB, consistent foot placement    Standing marches  10x with light UE support 2*20 steps no UE support  2x10 w/o UE support 2x10 w/o UE support    Hip abd    1x10 R/L     Step ups    6 in 10x L No UE support  6\" 10x L up/R down    8\" 10x L up/R down    Obstacle     No AD  Over 2 red curbs and around cone x 4 reps. No LOB    Stairs     11 stairs  Light CGA descending d/t left quad weakness. Supervision ascending. Modified reciprocal w/break on each step. PROPRIOCEPTION         BOSU lunge LLE     // bars No UE support  2 x 10                                        MODALITIES                               Other Therapeutic Activities/Education:      Home Exercise Program:    See above. Handouts provided. Manual Treatments:          Modalities:    NO  Declined. Communication with other providers:   NO    Assessment:   Tolerated today's session very well. Able to advance standing balance activities w/o AD. Weak left quad w/descending stairs (eccentric lowering). L HC tightness, LLE tends to externally rotate. 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 STM left gastroc/quad/ITB (retrograde massage/TrP release quad/ITB), balance/proprioception  Heel cord stretch black wedge.   Eccentric quads  Resistance gait w/cable column      Time In / Time Out:  1115/1215    Timed Code/Total Treatment Minutes: 60'/60'  TE 30' (2), GT 15' (1), NRE 15' (1)      Next Progress Note due:  10/07/21 (will extend to 11/07/21)      Plan of Care Interventions:  [x] Therapeutic Exercise  [x] Modalities:  [x] Therapeutic Activity     [x] Ultrasound  [x] Estim  [x] Gait Training      [] Cervical Traction [] Lumbar Traction  [x] Neuromuscular Re-education    [x] Cold/hotpack [] Iontophoresis   [x] Instruction in HEP      [] Vasopneumatic   [x] Dry Needling    [x] Manual Therapy               [] Aquatic Therapy              Electronically signed by:  Cecilio Ellis, JOSE         9/24/2021, 11:38 AM

## 2021-09-27 ENCOUNTER — HOSPITAL ENCOUNTER (OUTPATIENT)
Dept: PHYSICAL THERAPY | Age: 56
Setting detail: THERAPIES SERIES
Discharge: HOME | End: 2021-09-27
Payer: COMMERCIAL

## 2021-09-27 PROCEDURE — 97530 THERAPEUTIC ACTIVITIES: CPT

## 2021-09-27 PROCEDURE — 97110 THERAPEUTIC EXERCISES: CPT

## 2021-09-27 NOTE — FLOWSHEET NOTE
Outpatient Physical Therapy  [place]           [x] Phone: [phone]   Fax: [phone]  [place]           [] Phone: [phone]   Fax: [phone]        Physical Therapy Daily Treatment Note  Date:  2021    Patient Name:  Rosalie Romberg    :  1965  MRN: [id number]  Restrictions/Precautions:  L PATRICIO w/posterior precautions. Diagnosis:   Diagnosis: L PATRICIO  21  Date of Injury/Surgery: 21  Treatment Diagnosis: Treatment Diagnosis: L hip pain, weakness, dec ROM, gait dysfunction    Insurance/Certification information: PT Insurance Information: [facility]   Referring Physician:  Referring Practitioner: Dr. Savanna Cornelius  Next Doctor Visit:  Shavonne Ramsay of care signed (Y/N): Y  Outcome Measure:   HOOS  = 20% disability  TUG  Visit# / total visits:     Pain level: 0/10     Goals:       Short term goals  Time Frame for Short term goals: In 4 weeks, patient will  Short term goal 1: demonstrate compliance and independence w/HEP. Reports compliance   Short term goal 2: verbalize and demonstrate good understanding of hip precautions. Short term goal 3: perform TUG in <= 16 seconds, no AD  Short term goal 4: ambulate >= 500 ft in 6 min w/minimal discomfort. Short term goal 5: ascend/descend >= 6 stairs w/CGA, using (1) HR for light support safest method. - MET 21  Long term goals  Time Frame for Long term goals : In 8 weeks, patient will  Long term goal 1: demonstrate compliance and independence w/HEP. Long term goal 2: perform TUG in <= 12 seconds, no AD. Long term goal 3: ambulate >= 1,200 ft in 6 minutes, normalized gait improved safety and efficiency of gait in home and community. Long term goal 4: ascend/descend 11 stairs w/Mod Ind., using (1) HR for light support, reciprocal pattern for safety in home/community settings. Long term goal 5: score 0% disability on HOOS as indication of improved function w/daily activities.     Summary of Evaluation: Assessment: Pt reports a 2 year progressive increase in hip. Denies incident of onset. Taking tylenol to manage the pain. X-rays with severe OA. Decision to undergo surgery. DOS: 8/31/21. Lives with wife at home but taking couple of weeks off to assist pt needs. 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 Pt reports some issues since surgery:  \"I don't feel right. \"  Reports nausea and decreased hearing since surgery. Today, patient presents w/myofascial and surgical pain, gait dysfunction, impaired stregth/ROM, hip precautions and will benefit from physical therapy. Subjective:   Pt continues to report of no pain. Currently ambulating without assistive device. Any changes in Ambulatory Summary Sheet? None        Objective:  COVID screening questions were asked and patient attested that there had been no contact or symptoms    G(-) balance w/challanged balance/gait. Supervision (1) flight of stairs, (1) HR, modified reciprocal w/break on ea step. LLE Eccentric quad weakness  LLE Tight heel cord   Continues to have some groin pain with SLR          Exercises: (No more than 4 columns)   Exercise/Equipment 9/9/2021 #2 Date 9/13/21 #3 9/17/2021 #4 9/20/21 #5 9/24/21 #6 9/27/21 #7          Gait belt utilized on stairs TUG = 10 sec   WARM UP                              TABLE         Quad sets 2*10  3ct hold 2x10 2*10 3\" 2x10 3\" 2x10 3\"    SAQ 2*10 2x10 2# 2*10 2# 2*10 2# 2*10 2# 2*10   Hip adductor smitha.  w/ball 2*10 2x10 2*10 2x10 2x10 2x10   Heel slides 2*10 2x10 2*10 with slide board  2*10 with slide board 2*10 w/slide board 2*10 w/slide board   Heel digs 2*10 2x10 --      SLR 10* 10x 2*10 10x - did have some groin pain today 10x - did have some temporary groin pain (hip flexor m.) w/SLR ad heel slides 10x - did have some groin pain   Supine hip abd  10x with verbal and tactile cueing to limit hip ER 2*10 with slide board  2*10 with slide board 2*10 with slide board 2*10 with slide board   Sit-Stand    2*10 2x10 2 x 10 2x10                        STANDING         Gait training Gait training with [facility]   -  Floor ladder  Reciprocal  4 laps  Ind./no LOB, consistent foot placement --   Standing marches  10x with light UE support 2*20 steps no UE support  2x10 w/o UE support 2x10 w/o UE support 2x10 w/o UE support   Hip abd    1x10 R/L     Step ups    6 in 10x L No UE support  6\" 10x L up/R down    8\" 10x L up/R down No UE support  6\" 10x L up/R down    8\" 10x L up/R down   Obstacle     No AD  Over 2 red curbs and around cone x 4 reps. No LOB Half rolls, 6 in step, weaving in/out cones and walking on aeromat SBA   Stairs     11 stairs  Light CGA descending d/t left quad weakness. Supervision ascending. Modified reciprocal w/break on each step. 11 steps reciprocally with light CGA    mini squats          2x10   PROPRIOCEPTION         BOSU lunge LLE     // bars No UE support  2 x 10 At window No UE support 2x10                                       MODALITIES                               Other Therapeutic Activities/Education:      Home Exercise Program:    See above. Handouts provided. Manual Treatments:          Modalities:    NO  Declined. Communication with other providers:   NO    Assessment:   Pt demonstrated overall good tolerance to today's session without adverse reaction. End pain = 0/10, reports of tightness in groin    Plan for Next Session: Specific instructions for Next Treatment: PATRICIO protocol, advance as tolerated. STM left gastroc/quad/ITB (retrograde massage/TrP release quad/ITB), balance/proprioception  Heel cord stretch black wedge.   Eccentric quads  Resistance gait w/cable column      Time In / Time Out:  1117/1158    Timed Code/Total Treatment Minutes: 41/41, (1) TE, (2) TA,       Next Progress Note due:  10/07/21 (will extend to 11/07/21)      Plan of Care Interventions:  [x] Therapeutic Exercise  [x] Modalities:  [x] Therapeutic Activity     [x] Ultrasound  [x] Estim  [x] Gait Training      [] Cervical Traction [] Lumbar Traction  [x] Neuromuscular Re-education    [x] Cold/hotpack [] Iontophoresis   [x] Instruction in HEP      [] Vasopneumatic   [x] Dry Needling    [x] Manual Therapy               [] Aquatic Therapy              Electronically signed by:  Mikal Conley PTA         9/27/2021, 11:17 AM

## 2021-10-01 ENCOUNTER — HOSPITAL ENCOUNTER (OUTPATIENT)
Dept: PHYSICAL THERAPY | Age: 56
Setting detail: THERAPIES SERIES
Discharge: HOME | End: 2021-10-01
Payer: COMMERCIAL

## 2021-10-01 PROCEDURE — 97116 GAIT TRAINING THERAPY: CPT

## 2021-10-01 PROCEDURE — 97535 SELF CARE MNGMENT TRAINING: CPT

## 2021-10-01 PROCEDURE — 97112 NEUROMUSCULAR REEDUCATION: CPT

## 2021-10-01 PROCEDURE — 20560 NDL INSJ W/O NJX 1 OR 2 MUSC: CPT

## 2021-10-01 NOTE — FLOWSHEET NOTE
Outpatient Physical Therapy  [place]           [x] Phone: [phone]   Fax: [phone]  [place]           [] Phone: [phone]   Fax: [phone]        Physical Therapy Daily Treatment Note  Date:  10/1/2021    Patient Name:  Georgina Boss    :  1965  MRN: [id number]  Restrictions/Precautions:  L PATRICIO w/posterior precautions. Diagnosis:   Diagnosis: L PATRICIO  21  Date of Injury/Surgery: 21  Treatment Diagnosis: Treatment Diagnosis: L hip pain, weakness, dec ROM, gait dysfunction    Insurance/Certification information: PT Insurance Information: [facility]   Referring Physician:  Referring Practitioner: Dr. Tangela Lima  Next Doctor Visit:  10/22/21  Plan of care signed (Y/N): Y  Outcome Measure:   HOOS  = 20% disability (eval)    HOOS  =   5% disability (10/01/21)  TUG 9.2 sec (10/01/21)  6 Min walk:  1250 in 6 min, limp type gait (10/01/21)    Visit# / total visits:     Pain level: 0/10     Goals:       Short term goals  Time Frame for Short term goals: In 4 weeks, patient will  Short term goal 1: demonstrate compliance and independence w/HEP. Reports compliance   Short term goal 2: verbalize and demonstrate good understanding of hip precautions. MET 10/01/21  Short term goal 3: perform TUG in <= 16 seconds, no AD - MET 21  Short term goal 4: ambulate >= 500 ft in 6 min w/minimal discomfort. Short term goal 5: ascend/descend >= 6 stairs w/CGA, using (1) HR for light support safest method. - MET 21  Long term goals  Time Frame for Long term goals :  In 8 weeks, patient will  Long term goal 1: demonstrate compliance and independence w/HEP. - Ongoing/inconsistent w/HEP 10/01/21  Long term goal 2: perform TUG in <= 12 seconds, no AD. - MET 10/01/21  Long term goal 3: ambulate >= 1,200 ft in 6 minutes, normalized gait improved safety and efficiency of gait in home and community. - PARTIALLY MET 10/01/21  Long term goal 4: ascend/descend 11 stairs w/Mod Ind., using (1) HR for light support, reciprocal pattern for safety in home/community settings. Long term goal 5: score 0% disability on HOOS as indication of improved function w/daily activities. - UNMET 10/01/21    Summary of Evaluation: Assessment: Pt reports a 2 year progressive increase in hip. Denies incident of onset. Taking tylenol to manage the pain. X-rays with severe OA. Decision to undergo surgery. DOS: 8/31/21. Lives with wife at home but taking couple of weeks off to assist pt needs. 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 Pt reports some issues since surgery:  \"I don't feel right. \"  Reports nausea and decreased hearing since surgery. Today, patient presents w/myofascial and surgical pain, gait dysfunction, impaired stregth/ROM, hip precautions and will benefit from physical therapy. Subjective:   Pt continues to report of no pain. Ambulating w/o AD. Inconsistent w/HEP. Does not feel ready to return to [employer]  for [employer]. Any changes in Ambulatory Summary Sheet? None        Objective:  COVID screening questions were asked and patient attested that there had been no contact or symptoms    G(-) balance w/challanged balance/gait. Supervision (1) flight of stairs, (1) HR, modified reciprocal w/break on ea step. LLE Eccentric quad weakness  LLE Tight heel cord   Continues to have some groin pain with SLR    Hypertonic hip flexors/sartorius on the left  Ambulates w/limp, dec. hip extension L>R  LLE rotated outwardly  Per pt, has walked w/a limp for a long time d/t left leg shorter. No LLD identified w/measure ASIS to med. Malleolus. Exercises: (No more than 4 columns)   Exercise/Equipment 9/20/21 #5 9/24/21 #6 9/27/21 #7 10/01/21        Gait belt utilized on stairs TUG = 10 sec TUG 9.28  Goals assessed    WARM UP                           TABLE        Quad sets 2x10 3\" 2x10 3\"      SAQ 2# 2*10 2# 2*10 2# 2*10     Hip adductor smitha.  w/ball 2x10 2x10 2x10 Heel slides 2*10 with slide board 2*10 w/slide board 2*10 w/slide board     Heel digs        SLR 10x - did have some groin pain today 10x - did have some temporary groin pain (hip flexor m.) w/SLR ad heel slides 10x - did have some groin pain     Supine hip abd 2*10 with slide board 2*10 with slide board 2*10 with slide board     Sit-Stand  2x10 2 x 10 2x10                        STANDING        Gait training  Floor ladder  Reciprocal  4 laps  Ind./no LOB, consistent foot placement -- Gait Training:  Cues were given for heel>toe, correction of excessive hip ER. Six min walk:  1250 ft in 6 min. No fatigue. Standing marches 2x10 w/o UE support 2x10 w/o UE support 2x10 w/o UE support     Hip abd 1x10 R/L       Step ups 6 in 10x L No UE support  6\" 10x L up/R down    8\" 10x L up/R down No UE support  6\" 10x L up/R down    8\" 10x L up/R down     Obstacle  No AD  Over 2 red curbs and around cone x 4 reps. No LOB Half rolls, 6 in step, weaving in/out cones and walking on aeromat SBA     Stairs  11 stairs  Light CGA descending d/t left quad weakness. Supervision ascending. Modified reciprocal w/break on each step. 11 steps reciprocally with light CGA      mini squats       2x10     PROPRIOCEPTION        BOSU lunge LLE  // bars No UE support  2 x 10 At window No UE support 2x10                                     MODALITIES                            Other Therapeutic Activities/Education:    10/01/21:  DNT Acknowledgement form was provided, reviewed and signed. Pt notified that mild needle soreness x 1-2 days may be present and is an expected side effect. Mild bruising at needle site(s) may also occur. Patient wished to proceed. Wife present. Home Exercise Program:    See above. Handouts provided.     Manual Treatments:          Modalities:    DNT  L sartorius at pelvis and medial thigh  gluteus medius   0.30 x 60 mm       Communication with other providers:   NO    Assessment:   Pt demonstrated overall good tolerance to today's session without adverse reaction. End pain = 0/10    Plan for Next Session: Specific instructions for Next Treatment: PATRICIO protocol, advance as tolerated. balance/proprioception  Heel cord stretch black wedge.   Quads/glues  Hip flexor stretch edge of mat  Gait normalization   Resistance gait w/cable column      Time In / Time Out:  1115/1220    Timed Code/Total Treatment Minutes: 65'/65'  GT 15' (1), TA 20' (1), ADL 15' (1), DNT 13' (1)      Next Progress Note due:  10/07/21 (will extend to 11/07/21)      Plan of Care Interventions:  [x] Therapeutic Exercise  [x] Modalities:  [x] Therapeutic Activity     [x] Ultrasound  [x] Estim  [x] Gait Training      [] Cervical Traction [] Lumbar Traction  [x] Neuromuscular Re-education    [x] Cold/hotpack [] Iontophoresis   [x] Instruction in HEP      [] Vasopneumatic   [x] Dry Needling    [x] Manual Therapy               [] Aquatic Therapy              Electronically signed by:  Vela Babinski, PT         10/1/2021, 11:15 AM

## 2021-10-04 ENCOUNTER — HOSPITAL ENCOUNTER (OUTPATIENT)
Dept: PHYSICAL THERAPY | Age: 56
Setting detail: THERAPIES SERIES
Discharge: HOME | End: 2021-10-04
Payer: COMMERCIAL

## 2021-10-04 PROCEDURE — 97110 THERAPEUTIC EXERCISES: CPT

## 2021-10-04 NOTE — FLOWSHEET NOTE
Outpatient Physical Therapy  [place]           [x] Phone: [phone]   Fax: [phone]  [place]           [] Phone: [phone]   Fax: [phone]        Physical Therapy Daily Treatment Note  Date:  10/4/2021    Patient Name:  Sherry Flores    :  1965  MRN: [id number]  Restrictions/Precautions:  L PATRICIO w/posterior precautions. Diagnosis:   Diagnosis: L PATRICIO  21  Date of Injury/Surgery: 21  Treatment Diagnosis: Treatment Diagnosis: L hip pain, weakness, dec ROM, gait dysfunction    Insurance/Certification information: PT Insurance Information: [facility]   Referring Physician:  Referring Practitioner: Dr. Miles Schlatter  Next Doctor Visit:  10/22/21  Plan of care signed (Y/N): Y  Outcome Measure:   HOOS  = 20% disability (eval)    HOOS  =   5% disability (10/01/21)  TUG 9.2 sec (10/01/21)  6 Min walk:  1250 in 6 min, limp type gait (10/01/21)    Visit# / total visits:     Pain level: 0/10     Goals:       Short term goals  Time Frame for Short term goals: In 4 weeks, patient will  Short term goal 1: demonstrate compliance and independence w/HEP. Reports compliance   Short term goal 2: verbalize and demonstrate good understanding of hip precautions. MET 10/01/21  Short term goal 3: perform TUG in <= 16 seconds, no AD - MET 21  Short term goal 4: ambulate >= 500 ft in 6 min w/minimal discomfort. Met   Short term goal 5: ascend/descend >= 6 stairs w/CGA, using (1) HR for light support safest method. - MET 21  Long term goals  Time Frame for Long term goals :  In 8 weeks, patient will  Long term goal 1: demonstrate compliance and independence w/HEP. - Ongoing/inconsistent w/HEP 10/01/21  Long term goal 2: perform TUG in <= 12 seconds, no AD. - MET 10/01/21  Long term goal 3: ambulate >= 1,200 ft in 6 minutes, normalized gait improved safety and efficiency of gait in home and community. - PARTIALLY MET 10/01/21  Long term goal 4: ascend/descend 11 stairs w/Mod Ind., using (1) HR for light support, reciprocal pattern for safety in home/community settings. Long term goal 5: score 0% disability on HOOS as indication of improved function w/daily activities. - UNMET 10/01/21    Summary of Evaluation: Assessment: Pt reports a 2 year progressive increase in hip. Denies incident of onset. Taking tylenol to manage the pain. X-rays with severe OA. Decision to undergo surgery. DOS: 8/31/21. Lives with wife at home but taking couple of weeks off to assist pt needs. 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 Pt reports some issues since surgery:  \"I don't feel right. \"  Reports nausea and decreased hearing since surgery. Today, patient presents w/myofascial and surgical pain, gait dysfunction, impaired stregth/ROM, hip precautions and will benefit from physical therapy. Subjective: Ailin Flores arrives to therapy stating that the hip feels fine, no pain. Any changes in Ambulatory Summary Sheet? None        Objective:  COVID screening questions were asked and patient attested that there had been no contact or symptoms      Ambulates into clinic with no AD and mild limp. Lateral trunk lean with L LE hip abduction in standing. Cues for posterior weight shift with squats. Exercises: (No more than 4 columns)   Exercise/Equipment 9/27/21 #7 10/01/21 10/4/2021 #9      TUG = 10 sec TUG 9.28  Goals assessed    WARM UP      Nu-step S12/A12/L3   5'         TABLE      SLR 10x - did have some groin pain     Sit-Stand  2x10                    STANDING      Gait training -- Gait Training:  Cues were given for heel>toe, correction of excessive hip ER. Six min walk:  1250 ft in 6 min. No fatigue.  --   Hip abd   Standing 2*10 ea LE    Step ups No UE support  6\" 10x L up/R down    8\" 10x L up/R down  Lateral step up 6\" 3*10   Obstacle Half rolls, 6 in step, weaving in/out cones and walking on aeromat SBA     Stairs 11 steps reciprocally with light CGA     mini squats 2x10  2*10   Resisted walking at    13# 5 laps ea  SBA for balance          PROPRIOCEPTION      BOSU lunge LLE At window No UE support 2x10  2*10 ea LE    Wobble board    S/S 2*30\"                     MODALITIES                      Other Therapeutic Activities/Education:    10/01/21:  DNT Acknowledgement form was provided, reviewed and signed. Pt notified that mild needle soreness x 1-2 days may be present and is an expected side effect. Mild bruising at needle site(s) may also occur. Patient wished to proceed. Wife present. Home Exercise Program:    See above. Handouts provided. Manual Treatments:          Modalities:          Communication with other providers:   NO    Assessment: Eleuterio Farah is progressing nicely towards his goals. Still dealing with a mild limp secondary to weakness in the glut med. He experiences frequent LOB during standing exercises but does not require any assist to recover. End session pian: 0/10      Plan for Next Session: Specific instructions for Next Treatment: PATRICIO protocol, advance as tolerated. balance/proprioception  Heel cord stretch black wedge.   Quads/glues  Hip flexor stretch edge of mat  Gait normalization   Resistance gait w/cable column      Time In / Time Out:  1115/1153    Timed Code/Total Treatment Minutes: 45' 3 TE       Next Progress Note due:  10/07/21 (will extend to 11/07/21)      Plan of Care Interventions:  [x] Therapeutic Exercise  [x] Modalities:  [x] Therapeutic Activity     [x] Ultrasound  [x] Estim  [x] Gait Training      [] Cervical Traction [] Lumbar Traction  [x] Neuromuscular Re-education    [x] Cold/hotpack [] Iontophoresis   [x] Instruction in HEP      [] Vasopneumatic   [x] Dry Needling    [x] Manual Therapy               [] Aquatic Therapy              Electronically signed by:  Ania Pittman             10/4/2021, 9:06 AM

## 2021-10-07 ENCOUNTER — HOSPITAL ENCOUNTER (OUTPATIENT)
Dept: PHYSICAL THERAPY | Age: 56
Setting detail: THERAPIES SERIES
Discharge: HOME | End: 2021-10-07
Payer: COMMERCIAL

## 2021-10-07 PROCEDURE — 97530 THERAPEUTIC ACTIVITIES: CPT

## 2021-10-07 PROCEDURE — 97112 NEUROMUSCULAR REEDUCATION: CPT

## 2021-10-07 PROCEDURE — 97110 THERAPEUTIC EXERCISES: CPT

## 2021-10-07 NOTE — PROGRESS NOTES
Physical Therapy     Outpatient Physical Therapy        [] Phone: [phone]   Fax: [phone]  Physician:  Dr. Rodri Benton        From: Sergio Daly, PT     Patient: Memo Hampton                    : 1965  Restrictions/Precautions:  L PATRICIO w/posterior precautions. Diagnosis:   Diagnosis: L PATRICIO  21  Date of Injury/Surgery: 21  Treatment Diagnosis: Treatment Diagnosis: L hip pain, weakness, dec ROM, gait dysfunction       Date: 10/7/2021    [x]  Progress Note                []  Discharge Note    Total Visits to date:    8 (from 21 - 10/07/21)   Cancels/No-shows to date:   0    Subjective:    Pt reports cutting the grass on Tuesday this week, irritating his surgical hip to a level of 10/10. Took Ibuprofen. Better now. Pt does not feel the DNT was helpful. Prominent Objective Findings:    GAIT  Ambulates community distances Indly w/o AD. Abducted and ER LLE, decreased heel>toe, dec knee flexion   Performs sit>stand w/greater ease since DNT.     MMT  Hip flex 4-/5  Hip ext 4-/5  Hip abd 4-/5  Knee flex/ext 4 to 4+/5     ROM  Tight hip ER, ITB and gastroc on the left (observation w/activities)       Assessment:    Pt has been seen x 10 physical therapy sessions from 21 - 10/07/21. Diagnosis:   Diagnosis: L PATRICIO  21  Date of Injury/Surgery: 21  Treatment Diagnosis: Treatment Diagnosis: L hip pain, weakness, dec ROM, gait dysfunction    All STGs met. LTG's are progressing nicely. Pt w/post-surgical ROM and strength loss, gait impairment which is improving. Hip rotators, gluteal muscles most involved in length and strength deficits. L ITB and gastroc shortening also identified. Patient will benefit from continued physical therapy for advance strengthening and gait retraining in preparation for return to work.          Goal Status:  [] Achieved [x] Partially Achieved  [] Not Achieved   Short term goals  Time Frame for Short term goals:  In 4 weeks, patient Continue per initial Plan of Care     [] Patient now discharged     [x] Additional visits requested, Please re-certify for additional visits:      Requested frequency/duration:  2x/week for 4 weeks    If we are requesting more visits, we fully anticipate the patient's condition is expected to improve within the treatment timeframe we are requesting. Electronically signed by:  Roberto Garza, PT 10/7/2021, 5:00 PM    If you have any questions or concerns, please don't hesitate to call.   Thank you for your referral.    Physician Signature:______________________ Date:______ Time: ________  By signing above, therapists plan is approved by physician

## 2021-10-07 NOTE — FLOWSHEET NOTE
using (1) HR for light support, reciprocal pattern for safety in home/community settings. - INCONSISTENTLY MET 10/07/21  Long term goal 5: score 0% disability on HOOS as indication of improved function w/daily activities. - UNMET 10/01/21    Summary of Evaluation: Assessment: Pt reports a 2 year progressive increase in hip. Denies incident of onset. Taking tylenol to manage the pain. X-rays with severe OA. Decision to undergo surgery. DOS: 8/31/21. Lives with wife at home but taking couple of weeks off to assist pt needs. 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 Pt reports some issues since surgery:  \"I don't feel right. \"  Reports nausea and decreased hearing since surgery. Today, patient presents w/myofascial and surgical pain, gait dysfunction, impaired stregth/ROM, hip precautions and will benefit from physical therapy. Subjective:  Pt reports cutting the grass on Tuesday this week, irritating his surgical hip to a level of 10/10. Took Ibuprofen. Better now. Pt does not feel the DNT was helpful. Any changes in Ambulatory Summary Sheet? None        Objective:  COVID screening questions were asked and patient attested that there had been no contact or symptoms    GAIT  Ambulates community distances Indly w/o AD. Abducted and ER LLE, decreased heel>toe, dec knee flexion     MMT  Hip flex 4-/5  Hip ext 4-/5  Hip abd 4-/5  Knee flex/ext 4 to 4+/5    ROM  Tight hip ER, ITB and gastroc on the left (observation w/activities)      Exercises: (No more than 4 columns)   Exercise/Equipment 9/27/21 #7 10/01/21 10/4/2021 #9 10/07/21 #10      TUG = 10 sec TUG 9.28  Goals assessed  PROGRESS NOTE   WARM UP       Nu-step S12/A12/L3   5'           TABLE       SLR 10x - did have some groin pain      Sit-Stand  2x10      Knee squeezes to mid-line    2 x 10             STANDING       Gait training -- Gait Training:  Cues were given for heel>toe, correction of excessive hip ER.   Six min walk:  1250 ft in 6 min. No fatigue. -- Able to take large strides, good LLE positioning when working on CC resistive gait. Falls back into awkward \"limping\" type pattern in normal situation. - [place] behavior/muscle memory. Hip abd   Standing 2*10 ea LE     Step ups No UE support  6\" 10x L up/R down    8\" 10x L up/R down  Lateral step up 6\" 3*10 6\" step  Forward  R/L ea  2 x 10  6\" lateral step  R/L ea  2 x 10    8\" step  LLE   Forward x 10  Lateral x 10   Obstacle Half rolls, 6 in step, weaving in/out cones and walking on aeromat SBA      Stairs 11 steps reciprocally with light CGA      mini squats     2x10  2*10    Resisted walking at FM   13# 5 laps ea  SBA for balance  20#  5 - 6 laps x 4 directions  SBA for balance d/t lack of eccentric control   Ladder ([place])    Up/down first 3 rungs reciprocal pattern x 1    Up/down first 2 rungs reciprocal pattern x 5    LLE compensating by rotating externally. Close SBA to light CGA. Pt reports needing to perform similar strenuous activities for his job. PROPRIOCEPTION       BOSU lunge LLE At window No UE support 2x10  2*10 ea LE  2*10  BLE  Minimal touch R // bar  W/RLE lunge   Wobble board    S/S 2*30\" S/S 2*30\"                        MODALITIES                         Other Therapeutic Activities/Education:    10/01/21:  DNT Acknowledgement form was provided, reviewed and signed. Pt notified that mild needle soreness x 1-2 days may be present and is an expected side effect. Mild bruising at needle site(s) may also occur. Patient wished to proceed. Wife present. Home Exercise Program:    See above. Handouts provided. Manual Treatments: Instructed in manual ITB massage    Modalities:    NO    Communication with other providers:   Progress Note sent 10/07/21    Assessment:   Pt has been seen x 10 physical therapy sessions from 9/07/21 - 10/07/21.   Diagnosis:   Diagnosis: L PATRICIO  8/31/21  Date of Injury/Surgery: 8/31/21  Treatment Diagnosis: Treatment Diagnosis: L hip pain, weakness, dec ROM, gait dysfunction    All STGs met. LTG's are progressing nicely. Pt w/post-surgical ROM and strength loss, gait impairment which is improving. Hip rotators, gluteal muscles most involved in length and strength deficits. L ITB and gastroc shortening also identified. Patient will benefit from continued physical therapy for advance strengthening and gait retraining in preparation for return to work. Plan for Next Session: Specific instructions for Next Treatment: PATRICIO protocol, advance as tolerated. balance/proprioception  Heel cord stretch black wedge.   Quads/glutes  Hip flexor stretch edge of mat  Bike (knees lower than hips)  Gait normalization   Resistance gait w/cable column        Time In / Time Out:  1115/1210    Timed Code/Total Treatment Minutes: 55'/55'      Next Progress Note due:  11/07/21    Plan of Care Interventions:  [x] Therapeutic Exercise  [x] Modalities:  [x] Therapeutic Activity     [x] Ultrasound  [x] Estim  [x] Gait Training      [] Cervical Traction [] Lumbar Traction  [x] Neuromuscular Re-education    [x] Cold/hotpack [] Iontophoresis   [x] Instruction in HEP      [] Vasopneumatic   [x] Dry Needling    [x] Manual Therapy               [] Aquatic Therapy              Electronically signed by:  Venancio Landa PT     10/7/2021, 11:14 AM

## 2021-10-12 ENCOUNTER — HOSPITAL ENCOUNTER (OUTPATIENT)
Dept: PHYSICAL THERAPY | Age: 56
Setting detail: THERAPIES SERIES
Discharge: HOME | End: 2021-10-12
Payer: COMMERCIAL

## 2021-10-12 PROCEDURE — 97110 THERAPEUTIC EXERCISES: CPT

## 2021-10-12 PROCEDURE — 97112 NEUROMUSCULAR REEDUCATION: CPT

## 2021-10-12 PROCEDURE — 97530 THERAPEUTIC ACTIVITIES: CPT

## 2021-10-12 NOTE — FLOWSHEET NOTE
Outpatient Physical Therapy  [place]           [x] Phone: [phone]   Fax: [phone]  [facility]           [] Phone: [phone]   Fax: [phone]        Physical Therapy Daily Treatment Note  Date:  10/12/2021    Patient Name:  Melody Meckel    :  1965  MRN: [id number]  Restrictions/Precautions:  L PATRICIO w/posterior precautions. Diagnosis:   Diagnosis: L PATRICIO  21  Date of Injury/Surgery: 21  Treatment Diagnosis: Treatment Diagnosis: L hip pain, weakness, dec ROM, gait dysfunction    Insurance/Certification information: PT Insurance Information: [facility]   Referring Physician:  Referring Practitioner: Dr. Kami Hunter  Next Doctor Visit:  10/11/21  Plan of care signed (Y/N): Y  Outcome Measure:   HOOS  = 20% disability (eval)    HOOS  =   5% disability (10/01/21)  TUG 9.2 sec (10/01/21)  6 Min walk:  1250 in 6 min, limp type gait (10/01/21)    Visit# / total visits:       Pain level: 0/10     Goals:       Short term goals  Time Frame for Short term goals: In 4 weeks, patient will  Short term goal 1: demonstrate compliance and independence w/HEP. Reports compliance   Short term goal 2: verbalize and demonstrate good understanding of hip precautions. MET 10/01/21  Short term goal 3: perform TUG in <= 16 seconds, no AD - MET 21  Short term goal 4: ambulate >= 500 ft in 6 min w/minimal discomfort. Met   Short term goal 5: ascend/descend >= 6 stairs w/CGA, using (1) HR for light support safest method. - MET 21  Long term goals  Time Frame for Long term goals :  In 8 weeks, patient will  Long term goal 1: demonstrate compliance and independence w/HEP. - Ongoing/inconsistent w/HEP 10/01/21  Long term goal 2: perform TUG in <= 12 seconds, no AD. - MET 10/01/21  Long term goal 3: ambulate >= 1,200 ft in 6 minutes, normalized gait improved safety and efficiency of gait in home and community. - PARTIALLY MET 10/01/21  Long term goal 4: ascend/descend 11 stairs w/Mod Ind., using (1) HR for light support, reciprocal pattern for safety in home/community settings. - INCONSISTENTLY MET 10/07/21  Long term goal 5: score 0% disability on HOOS as indication of improved function w/daily activities. - UNMET 10/01/21    Summary of Evaluation: Assessment: Pt reports a 2 year progressive increase in hip. Denies incident of onset. Taking tylenol to manage the pain. X-rays with severe OA. Decision to undergo surgery. DOS: 8/31/21. Lives with wife at home but taking couple of weeks off to assist pt needs. 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 Pt reports some issues since surgery:  \"I don't feel right. \"  Reports nausea and decreased hearing since surgery. Today, patient presents w/myofascial and surgical pain, gait dysfunction, impaired stregth/ROM, hip precautions and will benefit from physical therapy. Subjective:  Pt states he is still sleeping on the couch. Is afraid he may roll over on his hip. Currently reports of no pain. Any changes in Ambulatory Summary Sheet? None        Objective:  COVID screening questions were asked and patient attested that there had been no contact or symptoms    GAIT  Ambulates community distances Indly w/o AD. Abducted and ER LLE, decreased heel>toe, dec knee flexion     MMT  Hip flex 4-/5  Hip ext 4-/5  Hip abd 4-/5  Knee flex/ext 4 to 4+/5    ROM  Tight hip ER, ITB and gastroc on the left (observation w/activities)      Exercises: (No more than 4 columns)   Exercise/Equipment 9/27/21 #7 10/01/21 10/4/2021 #9 10/07/21 #10 10/12/21 #11      TUG = 10 sec TUG 9.28  Goals assessed  PROGRESS NOTE    WARM UP        Nu-step S12/A12/L3   5'             TABLE        SLR 10x - did have some groin pain       Sit-Stand  2x10       Knee squeezes to mid-line    2 x 10 2x10              STANDING        Gait training -- Gait Training:  Cues were given for heel>toe, correction of excessive hip ER. Six min walk:  1250 ft in 6 min. No fatigue.  -- Able providers:   Progress Note sent 10/07/21    Assessment: Pt demonstrated overall good tolerance to today's session without increasing pain symptoms. Continues to have difficulty with eccentric control with resistance walking. Continue with therapy progressing as tolerated. End pain 0/10      Diagnosis:   Diagnosis: L PATRICIO  8/31/21  Date of Injury/Surgery: 8/31/21  Treatment Diagnosis: Treatment Diagnosis: L hip pain, weakness, dec ROM, gait dysfunction    All STGs met. LTG's are progressing nicely. Pt w/post-surgical ROM and strength loss, gait impairment which is improving. Hip rotators, gluteal muscles most involved in length and strength deficits. L ITB and gastroc shortening also identified. Patient will benefit from continued physical therapy for advance strengthening and gait retraining in preparation for return to work. Plan for Next Session: Specific instructions for Next Treatment: PATRICIO protocol, advance as tolerated. balance/proprioception  Heel cord stretch black wedge.   Quads/glutes  Hip flexor stretch edge of mat  Bike (knees lower than hips)  Gait normalization   Resistance gait w/cable column        Time In / Time Out:  0903/0946    Timed Code/Total Treatment Minutes: 43/43, (1) TE, (1) NR, (1) TA      Next Progress Note due:  11/07/21    Plan of Care Interventions:  [x] Therapeutic Exercise  [x] Modalities:  [x] Therapeutic Activity     [x] Ultrasound  [x] Estim  [x] Gait Training      [] Cervical Traction [] Lumbar Traction  [x] Neuromuscular Re-education    [x] Cold/hotpack [] Iontophoresis   [x] Instruction in HEP      [] Vasopneumatic   [x] Dry Needling    [x] Manual Therapy               [] Aquatic Therapy              Electronically signed by:  Donavan Mercado PTA     10/12/2021, 9:05 AM

## 2021-10-14 ENCOUNTER — HOSPITAL ENCOUNTER (OUTPATIENT)
Dept: PHYSICAL THERAPY | Age: 56
Setting detail: THERAPIES SERIES
Discharge: HOME | End: 2021-10-14
Payer: COMMERCIAL

## 2021-10-14 PROCEDURE — 97140 MANUAL THERAPY 1/> REGIONS: CPT

## 2021-10-14 PROCEDURE — 97112 NEUROMUSCULAR REEDUCATION: CPT

## 2021-10-14 PROCEDURE — 97110 THERAPEUTIC EXERCISES: CPT

## 2021-10-14 NOTE — FLOWSHEET NOTE
Outpatient Physical Therapy  [place]           [x] Phone: [phone]   Fax: [phone]  [place]           [] Phone: [phone]   Fax: [phone]        Physical Therapy Daily Treatment Note  Date:  10/14/2021    Patient Name:  Scott Diaz    :  1965  MRN: [id number]  Restrictions/Precautions:  L PATRICIO w/posterior precautions. Diagnosis:   Diagnosis: L PATRICIO  21  Date of Injury/Surgery: 21  Treatment Diagnosis: Treatment Diagnosis: L hip pain, weakness, dec ROM, gait dysfunction    Insurance/Certification information: PT Insurance Information: [facility]   Referring Physician:  Referring Practitioner: Dr. Ridge Shafferuty  Next Doctor Visit:  10/11/21  Plan of care signed (Y/N): Y  Outcome Measure:   HOOS 4 = 20% disability (eval)    HOOS 1 =   5% disability (10/01/21)  TUG 9.2 sec (10/01/21)  6 Min walk:  1250 in 6 min, limp type gait (10/01/21)    Visit# / total visits:       Pain level: 0/10     Goals:       Short term goals  Time Frame for Short term goals: In 4 weeks, patient will  Short term goal 1: demonstrate compliance and independence w/HEP. Reports compliance   Short term goal 2: verbalize and demonstrate good understanding of hip precautions. MET 10/01/21  Short term goal 3: perform TUG in <= 16 seconds, no AD - MET 21  Short term goal 4: ambulate >= 500 ft in 6 min w/minimal discomfort. Met   Short term goal 5: ascend/descend >= 6 stairs w/CGA, using (1) HR for light support safest method. - MET 21  Long term goals  Time Frame for Long term goals :  In 8 weeks, patient will  Long term goal 1: demonstrate compliance and independence w/HEP. - Ongoing/inconsistent w/HEP 10/01/21  Long term goal 2: perform TUG in <= 12 seconds, no AD. - MET 10/01/21  Long term goal 3: ambulate >= 1,200 ft in 6 minutes, normalized gait improved safety and efficiency of gait in home and community. - PARTIALLY MET 10/01/21  Long term goal 4: ascend/descend 11 stairs w/Mod Ind., using (1) HR for light support, reciprocal pattern for safety in home/community settings. - INCONSISTENTLY MET 10/07/21  Long term goal 5: score 0% disability on HOOS as indication of improved function w/daily activities. - UNMET 10/01/21    Summary of Evaluation: Assessment: Pt reports a 2 year progressive increase in hip. Denies incident of onset. Taking tylenol to manage the pain. X-rays with severe OA. Decision to undergo surgery. DOS: 8/31/21. Lives with wife at home but taking couple of weeks off to assist pt needs. 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 Pt reports some issues since surgery:  \"I don't feel right. \"  Reports nausea and decreased hearing since surgery. Today, patient presents w/myofascial and surgical pain, gait dysfunction, impaired stregth/ROM, hip precautions and will benefit from physical therapy. Subjective:  Rudolph Zhang arrives with a 0/10 pain. Objective:  COVID screening questions were asked and patient attested that there had been no contact or symptoms    GAIT  Ambulates community distances Indly w/o AD. Abducted and ER LLE, decreased heel>toe, dec knee flexion     MMT  Hip flex 4-/5  Hip ext 4-/5  Hip abd 4-/5  Knee flex/ext 4 to 4+/5    ROM  Tight hip ER, ITB and gastroc on the left (observation w/activities)      Exercises: (No more than 4 columns)   Exercise/Equipment 10/01/21 10/4/2021 #9 10/07/21 #10 10/12/21 #11 10/14/21 #12      TUG 9.28  Goals assessed  PROGRESS NOTE     WARM UP        Nu-step S12/A12/L3  5'   5'           TABLE        SLR     attempted with #3 wieght   Sit-Stand      2x10   Knee squeezes to mid-line   2 x 10 2x10 2x10              STANDING        Gait training Gait Training:  Cues were given for heel>toe, correction of excessive hip ER. Six min walk:  1250 ft in 6 min. No fatigue. -- Able to take large strides, good LLE positioning when working on CC resistive gait.     Falls back into awkward \"limping\" type pattern in normal situation. - [place] behavior/muscle memory. Hip abd  Standing 2*10 ea LE       Step ups  Lateral step up 6\" 3*10 6\" step  Forward  R/L ea  2 x 10  6\" lateral step  R/L ea  2 x 10    8\" step  LLE   Forward x 10  Lateral x 10 6\" step  Forward  R/L ea  2 x 10  6\" lateral step  R/L ea  2 x 10    8\" step  LLE   Forward x 10  Lateral x 10 12\" step  Forward  R/L ea  2 x 10  6\" lateral step  R/L ea  2 x 10    6\" step  LLE   Forward x 10  Lateral x 10   Obstacle        Stairs        mini squats      2*10      Resisted walking at FM  13# 5 laps ea  SBA for balance  20#  5 - 6 laps x 4 directions  SBA for balance d/t lack of eccentric control 20#  5  laps x 4 directions  SBA for balance d/t lack of eccentric control   20#  5  laps x 4 directions  SBA for balance d/t lack of eccentric control   Ladder ([employer])   Up/down first 3 rungs reciprocal pattern x 1    Up/down first 2 rungs reciprocal pattern x 5    LLE compensating by rotating externally. Close SBA to light CGA. Pt reports needing to perform similar strenuous activities for his job. Up/down 2 ladder rungs 7x with close SBA    PROPRIOCEPTION        BOSU lunge LLE  2*10 ea LE  2*10  BLE  Minimal touch R // bar  W/RLE lunge 10x2 alternating touching bars as needed    Wobble board   S/S 2*30\" S/S 2*30\" BOSU 30 sec x 2                            MODALITIES                            Other Therapeutic Activities/Education:    10/01/21:  DNT Acknowledgement form was provided, reviewed and signed. Pt notified that mild needle soreness x 1-2 days may be present and is an expected side effect. Mild bruising at needle site(s) may also occur. Patient wished to proceed. Wife present. Home Exercise Program:    See above. Handouts provided.     Manual Treatments:    SMT to Lateral hip and glutes 10'    Modalities:    NO    Communication with other providers:   Progress Note sent 10/07/21    Assessment: Pt demonstrated overall good tolerance to today's session without increasing pain symptoms. MT to decreases hip soreness. Attempted SLR today with 3# weight and patient is unable to d/t pain in his groin/hip adductors. Manual Hip adductor stretch 2x30\" which patient tolerated fairly. Minimal difficulty with eccentric control with resisted walking, more so in Lt lat stepping and forward directions. He is able to accent 12\" step with minimal instability. Continue with therapy progressing as tolerated. End pain 0/10      Diagnosis:   Diagnosis: L PATRICIO  8/31/21  Date of Injury/Surgery: 8/31/21  Treatment Diagnosis: Treatment Diagnosis: L hip pain, weakness, dec ROM, gait dysfunction    All STGs met. LTG's are progressing nicely. Pt w/post-surgical ROM and strength loss, gait impairment which is improving. Hip rotators, gluteal muscles most involved in length and strength deficits. L ITB and gastroc shortening also identified. Patient will benefit from continued physical therapy for advance strengthening and gait retraining in preparation for return to work. Plan for Next Session: Specific instructions for Next Treatment: PATRICIO protocol, advance as tolerated. balance/proprioception  Heel cord stretch black wedge. Quads/glutes  Hip flexor stretch edge of mat  Bike (knees lower than hips)  Gait normalization   Resistance gait w/cable column        Time In / Time Out:  1345/1430    Timed Code/Total Treatment Minutes: 39' (1) TE, (1) TA, (1) MT       Next Progress Note due:  11/07/21    Plan of Care Interventions:  [x] Therapeutic Exercise  [x] Modalities:  [x] Therapeutic Activity     [x] Ultrasound  [x] Estim  [x] Gait Training      [] Cervical Traction [] Lumbar Traction  [x] Neuromuscular Re-education    [x] Cold/hotpack [] Iontophoresis   [x] Instruction in HEP      [] Vasopneumatic   [x] Dry Needling    [x] Manual Therapy               [] Aquatic Therapy              Electronically signed by:   Miguelina Live PTA      10/14/2021,3:01 PM

## 2021-10-18 ENCOUNTER — OFFICE VISIT (OUTPATIENT)
Dept: ORTHOPEDIC SURGERY | Age: 56
End: 2021-10-18

## 2021-10-18 VITALS — HEIGHT: 71 IN | HEART RATE: 67 BPM | BODY MASS INDEX: 28.84 KG/M2 | WEIGHT: 206 LBS | OXYGEN SATURATION: 97 %

## 2021-10-18 DIAGNOSIS — Z96.642 STATUS POST TOTAL REPLACEMENT OF LEFT HIP: Primary | ICD-10-CM

## 2021-10-18 PROCEDURE — 99024 POSTOP FOLLOW-UP VISIT: CPT | Performed by: ORTHOPAEDIC SURGERY

## 2021-10-18 RX ORDER — METHYLPREDNISOLONE 4 MG/1
TABLET ORAL
Qty: 1 KIT | Refills: 0 | Status: SHIPPED | OUTPATIENT
Start: 2021-10-18 | End: 2021-10-24

## 2021-10-18 NOTE — PROGRESS NOTES
Patient returns to the office for a left PATRICIO 08/31/21. Patient states today his hip pain is a 5/10. Patient states he feels the most pain on the anterior aspect of his leg. Pt states he has not been using any ice or heat. Patient states he uses ibuprofen as needed for the pain with little relief. Pt states he has been going to physical therapy and it helps with the weakness.

## 2021-10-19 ASSESSMENT — ENCOUNTER SYMPTOMS
CHEST TIGHTNESS: 0
BACK PAIN: 0
COLOR CHANGE: 0

## 2021-10-19 NOTE — PROGRESS NOTES
Subjective:      Patient ID: Keyana Muhammad is a 48 y.o. male. Patient returns to the office for a left PATRICIO 08/31/21. Patient states today his hip pain is a 5/10. Patient states he feels the most pain on the anterior aspect of his leg. Pt states he has not been using any ice or heat. Patient states he uses ibuprofen as needed for the pain with little relief. Pt states he has been going to physical therapy and it helps with the weakness. He comes in today for his 6 -week postop recheck. Overall he states that he is feeling great and is not having much pain in the left hip. Patient denies any new injury to the involved extremity/ joint, denies numbness or tingling in the involved extremity and denies fever or chills. He states that he has been having occasional sharp pain in his left groin during active range of motion of the left hip. Review of Systems   Constitutional: Negative for activity change, chills and fever. Respiratory: Negative for chest tightness. Cardiovascular: Negative for chest pain. Musculoskeletal: Negative for arthralgias, back pain, gait problem, joint swelling and myalgias. Skin: Negative for color change, pallor, rash and wound. Neurological: Negative for weakness and numbness. Past Medical History:   Diagnosis Date    Arthritis     Diabetes mellitus ([facility] [address]     Hyperlipidemia        Objective:   Physical Exam  Constitutional:       Appearance: He is well-developed. HENT:      Head: Normocephalic. Eyes:      Pupils: Pupils are equal, round, and reactive to light. Pulmonary:      Effort: Pulmonary effort is normal.   Musculoskeletal:         General: No swelling, tenderness or deformity. Normal range of motion. Cervical back: Normal range of motion. Right hip: No deformity, lacerations, tenderness, bony tenderness or crepitus. Normal strength. Left hip: No deformity, lacerations, tenderness, bony tenderness or crepitus.  Normal range of motion. Normal strength. Right knee: Normal.      Left knee: Normal.   Skin:     General: Skin is warm and dry. Capillary Refill: Capillary refill takes less than 2 seconds. Coloration: Skin is not pale. Findings: No erythema or rash. Neurological:      Mental Status: He is alert and oriented to person, place, and time. Left hip-Incision clean, dry, intact, with no erythema, no drainage, and no signs of infection. No groin pain with range of motion of the left hip. Mild pain in the left groin with resisted active hip flexion. XR HIP 1 VW W PELVIS LEFT    Result Date: 10/18/2021  XRAY X-ray 2 views of the left hip and AP pelvis obtained and reviewed by me today in the office demonstrates age appropriate bone density throughout with well-positioned left total hip arthroplasty, there has been no change in position of components compared to prior x-rays, no subluxation or dislocation noted, no acute osseous abnormalities. Impression: Stable left total hip arthroplasty with no acute process. Assessment:      Left PATRICIO, 6 weeks      Plan:      I discussed with him today his x-ray findings. I explained to him that his implants are stable and remain in good alignment. I discussed with him today that he is progressing extremely well. I did give him a prescription for Medrol Dosepak to help with the tendinitis around his left hip. Continue weight-bearing as tolerated. Continue range of motion exercises as instructed. Ice and elevate as needed. Tylenol or Motrin for pain. Follow up in 6 weeks for recheck with x-rays of the left hip.               Mariluz Vargas, DO

## 2021-10-20 ENCOUNTER — HOSPITAL ENCOUNTER (OUTPATIENT)
Dept: PHYSICAL THERAPY | Age: 56
Setting detail: THERAPIES SERIES
Discharge: HOME | End: 2021-10-20
Payer: COMMERCIAL

## 2021-10-20 PROCEDURE — 97530 THERAPEUTIC ACTIVITIES: CPT

## 2021-10-20 PROCEDURE — 97110 THERAPEUTIC EXERCISES: CPT

## 2021-10-20 NOTE — FLOWSHEET NOTE
Outpatient Physical Therapy  [place]           [x] Phone: [phone]   Fax: [phone]  [place]           [] Phone: [phone]   Fax: [phone]        Physical Therapy Daily Treatment Note  Date:  10/20/2021    Patient Name:  Chelsea Claudio    :  1965  MRN: [id number]  Restrictions/Precautions:  L PATRICIO w/posterior precautions. Diagnosis:   Diagnosis: L PATRICIO  21  Date of Injury/Surgery: 21  Treatment Diagnosis: Treatment Diagnosis: L hip pain, weakness, dec ROM, gait dysfunction    Insurance/Certification information: PT Insurance Information: [facility]   Referring Physician:  Referring Practitioner: Dr. Bandar Lugo  Next Doctor Visit:  10/11/21  Plan of care signed (Y/N): Y  Outcome Measure:   HOOS  = 20% disability (eval)    HOOS  =   5% disability (10/01/21)  TUG 9.2 sec (10/01/21)  6 Min walk:  1250 in 6 min, limp type gait (10/01/21)    Visit# / total visits:       Pain level: 0/10     Goals:       Short term goals  Time Frame for Short term goals: In 4 weeks, patient will  Short term goal 1: demonstrate compliance and independence w/HEP. Reports compliance   Short term goal 2: verbalize and demonstrate good understanding of hip precautions. MET 10/01/21  Short term goal 3: perform TUG in <= 16 seconds, no AD - MET 21  Short term goal 4: ambulate >= 500 ft in 6 min w/minimal discomfort. Met   Short term goal 5: ascend/descend >= 6 stairs w/CGA, using (1) HR for light support safest method. - MET 21  Long term goals  Time Frame for Long term goals :  In 8 weeks, patient will  Long term goal 1: demonstrate compliance and independence w/HEP. - Ongoing/inconsistent w/HEP 10/01/21  Long term goal 2: perform TUG in <= 12 seconds, no AD. - MET 10/01/21  Long term goal 3: ambulate >= 1,200 ft in 6 minutes, normalized gait improved safety and efficiency of gait in home and community. - PARTIALLY MET 10/01/21  Long term goal 4: ascend/descend 11 stairs w/Mod Ind., using (1) HR for light support, reciprocal pattern for safety in home/community settings. - INCONSISTENTLY MET 10/07/21  Long term goal 5: score 0% disability on HOOS as indication of improved function w/daily activities. - UNMET 10/01/21    Summary of Evaluation: Assessment: Pt reports a 2 year progressive increase in hip. Denies incident of onset. Taking tylenol to manage the pain. X-rays with severe OA. Decision to undergo surgery. DOS: 8/31/21. Lives with wife at home but taking couple of weeks off to assist pt needs. 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 Pt reports some issues since surgery:  \"I don't feel right. \"  Reports nausea and decreased hearing since surgery. Today, patient presents w/myofascial and surgical pain, gait dysfunction, impaired stregth/ROM, hip precautions and will benefit from physical therapy. Subjective: Saad West arrives to therapy stating that the hip is okay, sore, saw doc Monday, gave him an oral steroid for the pain. X-ray looks good per doc. He fell yesterday morning, tripped over the ottoman, landed on carpet on the opposite side. The only thing that's bothering me is my knee, it's just really achy. Objective:  COVID screening questions were asked and patient attested that there had been no contact or symptoms    Able to perform 12\" lateral step up; however, demonstrates compensation so decreased to 10\"  One minor LOB during fwd step up requiring HHA on treadmill to correct.       Exercises: (No more than 4 columns)   Exercise/Equipment 10/12/21 #11 10/14/21 #12 10/20/2021 #13           WARM UP      Nu-step S12/A12/L3  5' 5'         TABLE      SLR  attempted with #3 wieght    Sit-Stand   2x10    Knee squeezes to mid-line 2x10 2x10             STANDING      Step ups 6\" step  Forward  R/L ea  2 x 10  6\" lateral step  R/L ea  2 x 10    8\" step  LLE   Forward x 10  Lateral x 10 12\" step  Forward  R/L ea  2 x 10  6\" lateral step  R/L ea  2 x 10    6\" step  LLE   Forward x 10  Lateral x 10 6\" lateral step up L LE 2*10    12\" fwd L LE 2*10    10\" 20* L LE lateral    Resisted walking at FM 20#  5  laps x 4 directions  SBA for balance d/t lack of eccentric control   20#  5  laps x 4 directions  SBA for balance d/t lack of eccentric control 20# 5 laps ea direction    Ladder (stairwell) Up/down 2 ladder rungs 7x with close SBA     Hip machine abduction/extension    25# 10* ea LE ea dir          PROPRIOCEPTION      BOSU lunge LLE 10x2 alternating touching bars as needed     Wobble board  BOSU 30 sec x 2                       MODALITIES                      Other Therapeutic Activities/Education:    10/01/21:  DNT Acknowledgement form was provided, reviewed and signed. Pt notified that mild needle soreness x 1-2 days may be present and is an expected side effect. Mild bruising at needle site(s) may also occur. Patient wished to proceed. Wife present. Home Exercise Program:    See above. Handouts provided. Manual Treatments:        Modalities:    NO    Communication with other providers:   Progress Note sent 10/07/21      Assessment: Jack Savage tolerated today's session well. He had a mild increase in hip pain with abduction on the machine but was able to perform the exercise. He is still moderately weak in the L glut med. End session pain: 1/10          Plan for Next Session: Specific instructions for Next Treatment: PATRICIO protocol, advance as tolerated. balance/proprioception  Heel cord stretch black wedge.   Quads/glutes  Hip flexor stretch edge of mat  Bike (knees lower than hips)  Gait normalization   Resistance gait w/cable column        Time In / Time Out:  1115/1153    Timed Code/Total Treatment Minutes: 45' 1 TE 15' 2 TA 23'      Next Progress Note due:  11/07/21    Plan of Care Interventions:  [x] Therapeutic Exercise  [x] Modalities:  [x] Therapeutic Activity     [x] Ultrasound  [x] Estim  [x] Gait Training      [] Cervical Traction [] Lumbar Traction  [x] Neuromuscular Re-education    [x] Cold/hotpack [] Iontophoresis   [x] Instruction in HEP      [] Vasopneumatic   [x] Dry Needling    [x] Manual Therapy               [] Aquatic Therapy              Electronically signed by:  Blaze Chau PTA         10/20/2021,9:43 AM

## 2021-10-22 ENCOUNTER — HOSPITAL ENCOUNTER (OUTPATIENT)
Dept: PHYSICAL THERAPY | Age: 56
Setting detail: THERAPIES SERIES
Discharge: HOME | End: 2021-10-22
Payer: COMMERCIAL

## 2021-10-22 PROCEDURE — 97112 NEUROMUSCULAR REEDUCATION: CPT

## 2021-10-22 PROCEDURE — 97110 THERAPEUTIC EXERCISES: CPT

## 2021-10-22 NOTE — FLOWSHEET NOTE
Outpatient Physical Therapy  [place]           [x] Phone: [phone]   Fax: [phone]  [place]           [] Phone: [phone]   Fax: [phone]        Physical Therapy Daily Treatment Note  Date:  10/22/2021    Patient Name:  Sandor Quintero    :  1965  MRN: [id number]  Restrictions/Precautions:  L PATRICIO w/posterior precautions. Diagnosis:   Diagnosis: L PATRICIO  21  Date of Injury/Surgery: 21  Treatment Diagnosis: Treatment Diagnosis: L hip pain, weakness, dec ROM, gait dysfunction    Insurance/Certification information: PT Insurance Information: [facility]   Referring Physician:  Referring Practitioner: Dr. Jolly Landon Doctor Visit:  10/11/21  Plan of care signed (Y/N): Y  Outcome Measure:   HOOS  = 20% disability (eval)    HOOS  =   5% disability (10/01/21)  TUG 9.2 sec (10/01/21)  6 Min walk:  1250 in 6 min, limp type gait (10/01/21)    Visit# / total visits:   14    Pain level: 0/10     Goals:       Short term goals  Time Frame for Short term goals: In 4 weeks, patient will  Short term goal 1: demonstrate compliance and independence w/HEP. Reports compliance   Short term goal 2: verbalize and demonstrate good understanding of hip precautions. MET 10/01/21  Short term goal 3: perform TUG in <= 16 seconds, no AD - MET 21  Short term goal 4: ambulate >= 500 ft in 6 min w/minimal discomfort. Met   Short term goal 5: ascend/descend >= 6 stairs w/CGA, using (1) HR for light support safest method. - MET 21  Long term goals  Time Frame for Long term goals :  In 8 weeks, patient will  Long term goal 1: demonstrate compliance and independence w/HEP. - Ongoing/inconsistent w/HEP 10/01/21  Long term goal 2: perform TUG in <= 12 seconds, no AD. - MET 10/01/21  Long term goal 3: ambulate >= 1,200 ft in 6 minutes, normalized gait improved safety and efficiency of gait in home and community. - PARTIALLY MET 10/01/21  Long term goal 4: ascend/descend 11 stairs w/Mod Ind., using (1) HR for light support, reciprocal pattern for safety in home/community settings. - INCONSISTENTLY MET 10/07/21  Long term goal 5: score 0% disability on HOOS as indication of improved function w/daily activities. - UNMET 10/01/21    Summary of Evaluation: Assessment: Pt reports a 2 year progressive increase in hip. Denies incident of onset. Taking tylenol to manage the pain. X-rays with severe OA. Decision to undergo surgery. DOS: 8/31/21. Lives with wife at home but taking couple of weeks off to assist pt needs. 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 Pt reports some issues since surgery:  \"I don't feel right. \"  Reports nausea and decreased hearing since surgery. Today, patient presents w/myofascial and surgical pain, gait dysfunction, impaired stregth/ROM, hip precautions and will benefit from physical therapy. Subjective:   No pain today. On steroids since Monday related to fall over ottoman. Objective:  COVID screening questions were asked and patient attested that there had been no contact or symptoms    Fatigue w/advanced hip/quad PREs, however tolerating. Gait pattern normalizing, no AD.     Exercises: (No more than 4 columns)   Exercise/Equipment 10/12/21 #11 10/14/21 #12 10/20/2021 #13 10/22/21 #14            WARM UP       Nu-step S12/A12/L3  5' 5' 5' at LV 6          TABLE       SLR  attempted with #3 wieght     Sit-Stand   2x10     Knee squeezes to mid-line 2x10 2x10               STANDING       Step ups 6\" step  Forward  R/L ea  2 x 10  6\" lateral step  R/L ea  2 x 10    8\" step  LLE   Forward x 10  Lateral x 10 12\" step  Forward  R/L ea  2 x 10  6\" lateral step  R/L ea  2 x 10    6\" step  LLE   Forward x 10  Lateral x 10 6\" lateral step up L LE 2*10    12\" fwd L LE 2*10    10\" 20* L LE lateral  12\" fwd L LE 2* x 10    8\" lateral step up  LLE  2 x 10       Step Up High Kicks    R x 10  L x 10  2# MB  8\" step   Resisted walking at FM 20#  5  laps x 4 directions  SBA for balance d/t lack of eccentric control   20#  5  laps x 4 directions  SBA for balance d/t lack of eccentric control 20# 5 laps ea direction  20# 5 laps ea direction    Ladder (stairwell) Up/down 2 ladder rungs 7x with close SBA      Hip machine abduction/extension    25# 10* ea LE ea dir  25# 10*   LLE focus  Avoided adduction past mid-line          PROPRIOCEPTION       BOSU lunge LLE 10x2 alternating touching bars as needed      Wobble board  BOSU 30 sec x 2                           MODALITIES                         Other Therapeutic Activities/Education:    10/01/21:  DNT Acknowledgement form was provided, reviewed and signed. Pt notified that mild needle soreness x 1-2 days may be present and is an expected side effect. Mild bruising at needle site(s) may also occur. Patient wished to proceed. Wife present. Home Exercise Program:    See above. Handouts provided. Manual Treatments:    NO    Modalities:    NO    Communication with other providers:   Progress Note sent 10/07/21      Assessment:   Tolerating PRE's. L gluteus medius weak and easy to fatigue. Minimal pain, mostly hip/thigh fatigue (quad)  Gait is normalizing. Continues to require vc for correcting excessive L hip ER w/gait and exercise  Expect continued gains w/physical therapy w/ projected DC 11/07/21. Plan for Next Session: Specific instructions for Next Treatment: PATRICIO protocol, advance as tolerated. balance/proprioception  Heel cord stretch black wedge.   Quads/glutes  Hip flexor stretch edge of mat  Bike (knees lower than hips)  Gait normalization   Resistance gait w/cable column        Time In / Time Out:  1118/1200    Timed Code/Total Treatment Minutes: 42'/42'  TE 17' (1), NRE 25' (2)      Next Progress Note due:  11/07/21    Plan of Care Interventions:  [x] Therapeutic Exercise  [x] Modalities:  [x] Therapeutic Activity     [x] Ultrasound  [x] Estim  [x] Gait Training      [] Cervical Traction [] Lumbar Traction  [x] Neuromuscular Re-education    [x] Cold/hotpack [] Iontophoresis   [x] Instruction in HEP      [] Vasopneumatic   [x] Dry Needling    [x] Manual Therapy               [] Aquatic Therapy              Electronically signed by:  Ana Brownlee, JOSE          10/22/2021,11:16 AM

## 2021-10-27 ENCOUNTER — HOSPITAL ENCOUNTER (OUTPATIENT)
Dept: PHYSICAL THERAPY | Age: 56
Setting detail: THERAPIES SERIES
Discharge: HOME | End: 2021-10-27
Payer: COMMERCIAL

## 2021-10-27 PROCEDURE — 97110 THERAPEUTIC EXERCISES: CPT

## 2021-10-27 NOTE — FLOWSHEET NOTE
Outpatient Physical Therapy  [place]           [x] Phone: [phone]   Fax: [phone]  [place]           [] Phone: [phone]   Fax: [phone]        Physical Therapy Daily Treatment Note  Date:  10/27/2021    Patient Name:  Tyrese Rankin    :  1965  MRN: [id number]  Restrictions/Precautions:  L PATRICIO w/posterior precautions. Diagnosis:   Diagnosis: L PATRICIO  21  Date of Injury/Surgery: 21  Treatment Diagnosis: Treatment Diagnosis: L hip pain, weakness, dec ROM, gait dysfunction    Insurance/Certification information: PT Insurance Information: [facility]   Referring Physician:  Referring Practitioner: Dr. Jarvis   Next Doctor Visit:  10/11/21  Plan of care signed (Y/N): Y  Outcome Measure:   HOOS  = 20% disability (eval)    HOOS  =   5% disability (10/01/21)  TUG 9.2 sec (10/01/21)  6 Min walk:  1250 in 6 min, limp type gait (10/01/21)    Visit# / total visits:   15    Pain level: 0/10     Goals:       Short term goals  Time Frame for Short term goals: In 4 weeks, patient will  Short term goal 1: demonstrate compliance and independence w/HEP. Reports compliance   Short term goal 2: verbalize and demonstrate good understanding of hip precautions. MET 10/01/21  Short term goal 3: perform TUG in <= 16 seconds, no AD - MET 21  Short term goal 4: ambulate >= 500 ft in 6 min w/minimal discomfort. Met   Short term goal 5: ascend/descend >= 6 stairs w/CGA, using (1) HR for light support safest method. - MET 21  Long term goals  Time Frame for Long term goals :  In 8 weeks, patient will  Long term goal 1: demonstrate compliance and independence w/HEP. - Ongoing/inconsistent w/HEP 10/01/21  Long term goal 2: perform TUG in <= 12 seconds, no AD. - MET 10/01/21  Long term goal 3: ambulate >= 1,200 ft in 6 minutes, normalized gait improved safety and efficiency of gait in home and community. - PARTIALLY MET 10/01/21  Long term goal 4: ascend/descend 11 stairs w/Mod Ind., using (1) HR for light support, reciprocal pattern for safety in home/community settings. - INCONSISTENTLY MET 10/07/21  Long term goal 5: score 0% disability on HOOS as indication of improved function w/daily activities. - UNMET 10/01/21    Summary of Evaluation: Assessment: Pt reports a 2 year progressive increase in hip. Denies incident of onset. Taking tylenol to manage the pain. X-rays with severe OA. Decision to undergo surgery. DOS: 8/31/21. Lives with wife at home but taking couple of weeks off to assist pt needs. 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 Pt reports some issues since surgery:  \"I don't feel right. \"  Reports nausea and decreased hearing since surgery. Today, patient presents w/myofascial and surgical pain, gait dysfunction, impaired stregth/ROM, hip precautions and will benefit from physical therapy. Subjective: Praveen Barron arrives to therapy stating that he is doing well. The steroid really helped his pain and feels like the muscle is finally working! Feels ready to be done with PT soon.          Objective:  COVID screening questions were asked and patient attested that there had been no contact or symptoms        Exercises: (No more than 4 columns)   Exercise/Equipment 10/20/2021 #13 10/22/21 #14 10/27/2021 #15           WARM UP      Nu-step S12/A12/L3 5' 5' at LV 6 5'         TABLE               STANDING      Step ups 6\" lateral step up L LE 2*10    12\" fwd L LE 2*10    10\" 20* L LE lateral  12\" fwd L LE 2* x 10    8\" lateral step up  LLE  2 x 10     12\" fwd 2*10    8\" lateral 2*10   Step Up High Kicks  R x 10  L x 10  2# MB  8\" step --   Resisted walking at FM 20# 5 laps ea direction  20# 5 laps ea direction  20# 5 laps ea dir    Ladder (stairwell)   --   Hip machine abduction/extension  25# 10* ea LE ea dir  25# 10*   LLE focus  Avoided adduction past mid-line 25# 10* ea LE ea direction - smaller range with hip abduction on the R to avoid increased soreness in the L LE    Sled push-pull   Next    Squat - med ball touch to floor    Next          PROPRIOCEPTION      BOSU lunge LLE      Wobble board                         MODALITIES                      Other Therapeutic Activities/Education:    10/01/21:  DNT Acknowledgement form was provided, reviewed and signed. Pt notified that mild needle soreness x 1-2 days may be present and is an expected side effect. Mild bruising at needle site(s) may also occur. Patient wished to proceed. Wife present. Home Exercise Program:    See above. Handouts provided. Manual Treatments:    NO    Modalities:    NO    Communication with other providers:   Progress Note sent 10/07/21      Assessment: Bola Bhatt tolerated today's session well. He is improving in strength, endurance, balance and general tolerance for exercise. Would benefit from a few more sessions to practice work related tasks. End session pain: 0/10      Plan for Next Session: Specific instructions for Next Treatment: PATRICIO protocol, advance as tolerated. balance/proprioception  Heel cord stretch black wedge.   Quads/glutes  Hip flexor stretch edge of mat  Bike (knees lower than hips)  Gait normalization   Resistance gait w/cable column        Time In / Time Out:  1115/1145    Timed Code/Total Treatment Minutes: 27' 2 TE       Next Progress Note due:  11/07/21    Plan of Care Interventions:  [x] Therapeutic Exercise  [x] Modalities:  [x] Therapeutic Activity     [x] Ultrasound  [x] Estim  [x] Gait Training      [] Cervical Traction [] Lumbar Traction  [x] Neuromuscular Re-education    [x] Cold/hotpack [] Iontophoresis   [x] Instruction in HEP      [] Vasopneumatic   [x] Dry Needling    [x] Manual Therapy               [] Aquatic Therapy              Electronically signed by:  Nina Bhardwaj PTA             10/27/2021,9:10 AM

## 2021-10-29 ENCOUNTER — HOSPITAL ENCOUNTER (OUTPATIENT)
Dept: PHYSICAL THERAPY | Age: 56
Setting detail: THERAPIES SERIES
Discharge: HOME | End: 2021-10-29
Payer: COMMERCIAL

## 2021-10-29 PROCEDURE — 97112 NEUROMUSCULAR REEDUCATION: CPT

## 2021-10-29 PROCEDURE — 97110 THERAPEUTIC EXERCISES: CPT

## 2021-10-29 NOTE — FLOWSHEET NOTE
Outpatient Physical Therapy  [place]           [x] Phone: [phone]   Fax: [phone]  Shira zamzam           [] Phone: [phone]   Fax: [phone]        Physical Therapy Daily Treatment Note  Date:  10/29/2021    Patient Name:  Alvaro Kingsley    :  1965  MRN: [id number]  Restrictions/Precautions:  L PATRICIO w/posterior precautions. Diagnosis:   Diagnosis: L PATRICIO  21  Date of Injury/Surgery: 21  Treatment Diagnosis: Treatment Diagnosis: L hip pain, weakness, dec ROM, gait dysfunction    Insurance/Certification information: PT Insurance Information: [facility]   Referring Physician:  Referring Practitioner: Dr. Tracy Charlton  Next Doctor Visit:  21  Plan of care signed (Y/N): Y  Outcome Measure:   HOOS 4 = 20% disability (eval)    HOOS 1 =   5% disability (10/01/21)  TUG 9.2 sec (10/01/21)  6 Min walk:  1250 in 6 min, limp type gait (10/01/21)    Visit# / total visits:   16    Pain level: 0/10     Goals:       Short term goals  Time Frame for Short term goals: In 4 weeks, patient will  Short term goal 1: demonstrate compliance and independence w/HEP. Reports compliance   Short term goal 2: verbalize and demonstrate good understanding of hip precautions. MET 10/01/21  Short term goal 3: perform TUG in <= 16 seconds, no AD - MET 21  Short term goal 4: ambulate >= 500 ft in 6 min w/minimal discomfort. Met   Short term goal 5: ascend/descend >= 6 stairs w/CGA, using (1) HR for light support safest method. - MET 21  Long term goals  Time Frame for Long term goals :  In 8 weeks, patient will  Long term goal 1: demonstrate compliance and independence w/HEP. - Ongoing/inconsistent w/HEP 10/01/21  Long term goal 2: perform TUG in <= 12 seconds, no AD. - MET 10/01/21  Long term goal 3: ambulate >= 1,200 ft in 6 minutes, normalized gait improved safety and efficiency of gait in home and community. - PARTIALLY MET 10/01/21  Long term goal 4: ascend/descend 11 stairs w/Mod Ind., using (1) HR for light support, reciprocal pattern for safety in home/community settings. - INCONSISTENTLY MET 10/07/21  Long term goal 5: score 0% disability on HOOS as indication of improved function w/daily activities. - UNMET 10/01/21    Summary of Evaluation: Assessment: Pt reports a 2 year progressive increase in hip. Denies incident of onset. Taking tylenol to manage the pain. X-rays with severe OA. Decision to undergo surgery. DOS: 8/31/21. Lives with wife at home but taking couple of weeks off to assist pt needs. 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 Pt reports some issues since surgery:  \"I don't feel right. \"  Reports nausea and decreased hearing since surgery. Today, patient presents w/myofascial and surgical pain, gait dysfunction, impaired stregth/ROM, hip precautions and will benefit from physical therapy. Subjective:   Probable return to work early December, light or restricted duty. No pain. Difficulty reaching to floor and putting on shoes d/t hip precautions. Objective:  COVID screening questions were asked and patient attested that there had been no contact or symptoms    Supervision balance activities. Supervision to [employer] ladder.   Min. antalgic gait after therapy session, no AD  Good endurance      Exercises: (No more than 4 columns)   Exercise/Equipment 10/20/2021 #13 10/22/21 #14 10/27/2021 #15 10/29/21  #16            WARM UP       Nu-step S12/A12/L3 5' 5' at LV 6 5' 5' LV 6          TABLE                 STANDING       Step ups 6\" lateral step up L LE 2*10    12\" fwd L LE 2*10    10\" 20* L LE lateral  12\" fwd L LE 2* x 10    8\" lateral step up  LLE  2 x 10     12\" fwd 2*10    8\" lateral 2*10 12\" fwd 2*10    8\" lateral 2*10   Step Up High Kicks  R x 10  L x 10  2# MB  8\" step -- High kicks w/forward gait  50' x 4  2# MB   Resisted walking at FM 20# 5 laps ea direction  20# 5 laps ea direction  20# 5 laps ea dir  Next   Ladder (stairwell)   -- 6 repetitions  (3)RLE lead  (3)LLE lead  Up/down 3 rungs, CGA for safety using gait belt  Single step pattern     Hip machine abduction/extension  25# 10* ea LE ea dir  25# 10*   LLE focus  Avoided adduction past mid-line 25# 10* ea LE ea direction - smaller range with hip abduction on the R to avoid increased soreness in the L LE  Next   Sled push-pull   Next  Next   Squat - med ball touch to floor    Next  Next          PROPRIOCEPTION       BOSU lunge LLE    BOSU static stand black side  2'  (2) mild LOB requiring unilateral UE touch to rail. Wobble board                             MODALITIES                         Other Therapeutic Activities/Education:    10/01/21:  DNT Acknowledgement form was provided, reviewed and signed. Pt notified that mild needle soreness x 1-2 days may be present and is an expected side effect. Mild bruising at needle site(s) may also occur. Patient wished to proceed. Wife present. Home Exercise Program:    See above. Handouts provided. Manual Treatments:    NO    Modalities:    NO    Communication with other providers:   Progress Note sent 10/07/21      Assessment:  Bola Bhatt tolerated today's session well. He is improving in strength, endurance, balance and general tolerance for exercise. Continues to have excessive left hip ER related to surgery and post-surgical hip precautions. Would benefit from a few more sessions to practice work related tasks. End session pain: 0/10      Plan for Next Session: Specific instructions for Next Treatment: PATRICIO protocol, advance as tolerated. balance/proprioception  Heel cord stretch black wedge.   Quads/glutes  Hip flexor stretch edge of mat  Bike (knees lower than hips)  Gait normalization   Resistance gait w/cable column  Single leg forward bend/cone retrieval   squats        Time In / Time Out:  1120/1200    Timed Code/Total Treatment Minutes: 36'  TE 30' (2), NRE 10' (1)      Next Progress Note due:  11/07/21 w/probable discharge    Plan of Care Interventions:  [x] Therapeutic Exercise  [x] Modalities:  [x] Therapeutic Activity     [x] Ultrasound  [x] Estim  [x] Gait Training      [] Cervical Traction [] Lumbar Traction  [x] Neuromuscular Re-education    [x] Cold/hotpack [] Iontophoresis   [x] Instruction in HEP      [] Vasopneumatic   [x] Dry Needling    [x] Manual Therapy               [] Aquatic Therapy              Electronically signed by:  Benetta Schwab, PT        10/29/2021,11:18 AM

## 2021-11-03 ENCOUNTER — HOSPITAL ENCOUNTER (OUTPATIENT)
Dept: PHYSICAL THERAPY | Age: 56
Setting detail: THERAPIES SERIES
Discharge: HOME | End: 2021-11-03
Payer: COMMERCIAL

## 2021-11-03 PROCEDURE — 97110 THERAPEUTIC EXERCISES: CPT

## 2021-11-03 PROCEDURE — 97530 THERAPEUTIC ACTIVITIES: CPT

## 2021-11-03 NOTE — FLOWSHEET NOTE
Outpatient Physical Therapy  [place]           [x] Phone: [phone]   Fax: [phone]  [place]           [] Phone: [phone]   Fax: [phone]        Physical Therapy Daily Treatment Note  Date:  11/3/2021    Patient Name:  Romana Kathleen    :  1965  MRN: [id number]  Restrictions/Precautions:  L PATRICIO w/posterior precautions. Diagnosis:   Diagnosis: L PATRICIO  21  Date of Injury/Surgery: 21  Treatment Diagnosis: Treatment Diagnosis: L hip pain, weakness, dec ROM, gait dysfunction    Insurance/Certification information: PT Insurance Information: [facility]   Referring Physician:  Referring Practitioner: Dr. Michael Sauer  Next Doctor Visit:  21  Plan of care signed (Y/N): Y  Outcome Measure:   HOOS 420 = 20% disability (eval)    HOOS 1/20 =   5% disability (10/01/21)  TUG 9.2 sec (10/01/21)  6 Min walk:  1250 in 6 min, limp type gait (10/01/21)    Visit# / total visits:   17/    Pain level: 0/10     Goals:       Short term goals  Time Frame for Short term goals: In 4 weeks, patient will  Short term goal 1: demonstrate compliance and independence w/HEP. Reports compliance   Short term goal 2: verbalize and demonstrate good understanding of hip precautions. MET 10/01/21  Short term goal 3: perform TUG in <= 16 seconds, no AD - MET 21  Short term goal 4: ambulate >= 500 ft in 6 min w/minimal discomfort. Met   Short term goal 5: ascend/descend >= 6 stairs w/CGA, using (1) HR for light support safest method. - MET 21  Long term goals  Time Frame for Long term goals :  In 8 weeks, patient will  Long term goal 1: demonstrate compliance and independence w/HEP. - Ongoing/inconsistent w/HEP 10/01/21  Long term goal 2: perform TUG in <= 12 seconds, no AD. - MET 10/01/21  Long term goal 3: ambulate >= 1,200 ft in 6 minutes, normalized gait improved safety and efficiency of gait in home and community. - PARTIALLY MET 10/01/21  Long term goal 4: ascend/descend 11 stairs w/Mod Ind., using (1) HR for light support, reciprocal pattern for safety in home/community settings. - INCONSISTENTLY MET 10/07/21  Long term goal 5: score 0% disability on HOOS as indication of improved function w/daily activities. - UNMET 10/01/21    Summary of Evaluation: Assessment: Pt reports a 2 year progressive increase in hip. Denies incident of onset. Taking tylenol to manage the pain. X-rays with severe OA. Decision to undergo surgery. DOS: 8/31/21. Lives with wife at home but taking couple of weeks off to assist pt needs. 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 Pt reports some issues since surgery:  \"I don't feel right. \"  Reports nausea and decreased hearing since surgery. Today, patient presents w/myofascial and surgical pain, gait dysfunction, impaired stregth/ROM, hip precautions and will benefit from physical therapy. Subjective: Jack Savage arrives to therapy stating that the hip is doing well, no pain currently. Objective:  COVID screening questions were asked and patient attested that there had been no contact or symptoms    Occasional LOB with resisted walking with increased resistance but patient able to adjust for this independently and got better as he went further into the repetitions. Improved form with step ups, less compensation noted.       Exercises: (No more than 4 columns)   Exercise/Equipment 10/27/2021 #15 10/29/21  #16 11/3/2021 #17           WARM UP      Nu-step S12/A12/L3 5' 5' LV 6 5'         TABLE               STANDING      Step ups 12\" fwd 2*10    8\" lateral 2*10 12\" fwd 2*10    8\" lateral 2*10 12\" fwd/lat 20* ea   Step Up High Kicks -- High kicks w/forward gait  50' x 4  2# MB    Resisted walking at FM 20# 5 laps ea dir  Next 27# 5* ea dir    Ladder (stairwell) -- 6 repetitions  (3)RLE lead  (3)LLE lead  Up/down 3 rungs, CGA for safety using gait belt  Single step pattern   --   Hip machine abduction/extension  25# 10* ea LE ea direction - smaller range with hip abduction on the R to avoid increased soreness in the L LE  Next 25# 10* ea dir ea LE    Sled push-pull Next  Next +55# 2 laps 60' ea direction   Squat - med ball touch to floor  Next  Next 10# 2*10         PROPRIOCEPTION      BOSU lunge LLE  BOSU static stand black side  2'  (2) mild LOB requiring unilateral UE touch to rail. BOSU static balance black side 2' no LOB   Wobble board                         MODALITIES                      Other Therapeutic Activities/Education:    10/01/21:  DNT Acknowledgement form was provided, reviewed and signed. Pt notified that mild needle soreness x 1-2 days may be present and is an expected side effect. Mild bruising at needle site(s) may also occur. Patient wished to proceed. Wife present. Home Exercise Program:    See above. Handouts provided. Manual Treatments:    NO    Modalities:    NO    Communication with other providers:   Progress Note sent 10/07/21      Assessment: Thais Thompson tolerated progression of activities in the clinic this date to include more work relative tasks without any increase in pain. He is improving with balance and strength. End session pain: 0/10        Plan for Next Session: Specific instructions for Next Treatment: PATRICIO protocol, advance as tolerated. balance/proprioception  Heel cord stretch black wedge.   Quads/glutes  Hip flexor stretch edge of mat  Bike (knees lower than hips)  Gait normalization   Resistance gait w/cable column  Single leg forward bend/cone retrieval   squats        Time In / Time Out:  1117/1155    Timed Code/Total Treatment Minutes: 45' 2 TA 25' 1 TE 13'      Next Progress Note due:  11/07/21 w/probable discharge    Plan of Care Interventions:  [x] Therapeutic Exercise  [x] Modalities:  [x] Therapeutic Activity     [x] Ultrasound  [x] Estim  [x] Gait Training      [] Cervical Traction [] Lumbar Traction  [x] Neuromuscular Re-education    [x] Cold/hotpack [] Iontophoresis   [x] Instruction in HEP      [] Vasopneumatic   [x] Dry Needling    [x] Manual Therapy               [] Aquatic Therapy              Electronically signed by:  Aureliano Smith PTA           11/3/2021,9:03 AM

## 2021-11-05 ENCOUNTER — HOSPITAL ENCOUNTER (OUTPATIENT)
Dept: PHYSICAL THERAPY | Age: 56
Setting detail: THERAPIES SERIES
Discharge: HOME | End: 2021-11-05
Payer: COMMERCIAL

## 2021-11-05 PROCEDURE — 97530 THERAPEUTIC ACTIVITIES: CPT

## 2021-11-05 PROCEDURE — 97116 GAIT TRAINING THERAPY: CPT

## 2021-11-05 PROCEDURE — 97110 THERAPEUTIC EXERCISES: CPT

## 2021-11-05 NOTE — DISCHARGE SUMMARY
Outpatient Physical Therapy        [] Phone: [phone]   Fax: [phone]  Physician:  Dr. Drew Resendez        From: Swetha Cervantes, PT    Patient: Yoel Gutiérrez                    : 1965        Date: 2021    []  Progress Note                [x]  Discharge Note    Total Visits to date:   25    Cancels/No-shows to date:   0    Subjective:    Pt reports his hips were very sore after last session; hamstrings. Better now. Not performing HEP regularly. Prominent Objective Findings:    HOOS: 0/20 (0%)  TU.8 sec, no UEs, no AD  6 Min walk: 1222 ft in 6 min, gait nearly normalized     Slight ER LLE, improving. Mild left HC tightness noted w/gait heel strike and on stairs. Stairs x 11 independent, no rail, reciprocal, no abnormalities other than slight HC tightness.     MMT  Hip flex 4/5  Hip ext 4-/5  Hip abd 4-/5  Hip add 4+/5  Knee flex/ext 5/5  DF 5/5     ROM:  Restriction w/hip IR (related to hip precautions post surgery)  Tight hamstrings, ITB and L gastroc. Reviewed stretches today. Assessment:    Patient has been seen x 18 physical therapy sessions from 21 - 21  Diagnosis:   Diagnosis: L PATRICIO  21  Treatment Diagnosis: Treatment Diagnosis: L hip pain, weakness, dec ROM, gait dysfunction    HOOS: 0/20 (0%)  TU.8 sec, no UEs, no AD  6 Min walk: 1222 ft in 6 min, gait nearly normalized     All goals met. Pt expecting return to work soon, light duty.   Reviewed HEP today, encouraged pt to continue.     Discharge    Goal Status:  [x] Achieved [x] Partially Achieved  [] Not Achieved         Changes to goals: NO    Planned and/or provided Services:  [x] Therapeutic Exercise    [x] Modalities:  [x] Therapeutic Activity     [x] Ultrasound  [x] Electric Stimulation  [x] Gait Training      [] Cervical Traction    [] Lumbar Traction  [x] Neuromuscular Re-education  [x] Cold/hotpack [] Iontophoresis  [x] Instruction in HEP      Other:  [x] Manual Therapy       [] Vasopneumatic  [] Self care management                           [x] Dry needling trigger point/pain management                ? Electronically signed by:  Gabe Mendoza, PT 11/5/2021, 12:24 PM    If you have any questions or concerns, please don't hesitate to call.   Thank you for your referral.

## 2021-11-05 NOTE — FLOWSHEET NOTE
Outpatient Physical Therapy  [place]           [x] Phone: [phone]   Fax: [phone]  Liliana Barry           [] Phone: [phone]   Fax: [phone]        Physical Therapy Daily Treatment Note/DISCHARGE  Date:  2021    Patient Name:  Georgina Boss    :  1965  MRN: [id number]  Restrictions/Precautions:  L PATRICIO w/posterior precautions. Diagnosis:   Diagnosis: L PATRICIO  21  Date of Injury/Surgery: 21  Treatment Diagnosis: Treatment Diagnosis: L hip pain, weakness, dec ROM, gait dysfunction    Insurance/Certification information: PT Insurance Information: [facility]   Referring Physician:  Referring Practitioner: Dr. Tangela Lima  Next Doctor Visit:  21  Plan of care signed (Y/N): Y  Outcome Measure:   HOOS 4/20 = 20% disability (eval)    HOOS 1/20 =   5% disability (10/01/21)  TUG 9.2 sec (10/01/21)  6 Min walk:  1250 in 6 min, limp type gait (10/01/21)    21:  HOOS: 0/20 (0%)  TU.8 sec, no UEs, no AD  6 Min walk: 1222 ft in 6 min, gait nearly normalized    Visit# / total visits:   18/    Pain level: 0/10     Goals:       Short term goals  Time Frame for Short term goals: In 4 weeks, patient will  Short term goal 1: demonstrate compliance and independence w/HEP. Reports compliance   Short term goal 2: verbalize and demonstrate good understanding of hip precautions. MET 10/01/21  Short term goal 3: perform TUG in <= 16 seconds, no AD - MET 21, MET 21  Short term goal 4: ambulate >= 500 ft in 6 min w/minimal discomfort. Met   Short term goal 5: ascend/descend >= 6 stairs w/CGA, using (1) HR for light support safest method. - MET 21  Long term goals  Time Frame for Long term goals :  In 8 weeks, patient will  Long term goal 1: demonstrate compliance and independence w/HEP. - INCONSISTENT PERFORMANCE 21  Long term goal 2: perform TUG in <= 12 seconds, no AD. - MET 10/01/21  Long term goal 3: ambulate >= 1,200 ft in 6 minutes, normalized gait improved safety and efficiency of gait in home and community. - MET 21  Long term goal 4: ascend/descend 11 stairs w/Mod Ind., using (1) HR for light support, reciprocal pattern for safety in home/community settings. - MET 21  Long term goal 5: score 0% disability on HOOS as indication of improved function w/daily activities. - MET 21    Summary of Evaluation: Assessment: Pt reports a 2 year progressive increase in hip. Denies incident of onset. Taking tylenol to manage the pain. X-rays with severe OA. Decision to undergo surgery. DOS: 21. Lives with wife at home but taking couple of weeks off to assist pt needs. 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 Pt reports some issues since surgery:  \"I don't feel right. \"  Reports nausea and decreased hearing since surgery. Today, patient presents w/myofascial and surgical pain, gait dysfunction, impaired stregth/ROM, hip precautions and will benefit from physical therapy. Subjective:   Pt reports his hips were very sore after last session; hamstrings. Better now. Objective:  COVID screening questions were asked and patient attested that there had been no contact or symptoms    HOOS: 0/20 (0%)  TU.8 sec, no UEs, no AD  6 Min walk: 1222 ft in 6 min, gait nearly normalized    Slight ER LLE, improving. Mild left HC tightness noted w/gait heel strike and on stairs. Stairs x 11 independent, no rail, reciprocal, no abnormalities other than slight HC tightness. MMT  Hip flex 4/5  Hip ext 4-/5  Hip abd 4-/5  Hip add 4+/5  Knee flex/ext 5/5  DF 5/5    ROM:  Restriction w/hip IR (related to hip precautions post surgery)  Tight hamstrings, ITB and L gastroc. Reviewed stretches today.         Exercises: (No more than 4 columns)   Exercise/Equipment 10/27/2021 #15 10/29/21  #16 11/3/2021 #17 21  #18         DISCHARGE   WARM UP       Nu-step S12/A12/L3 5' 5' LV 6 5' 5'          TABLE                 STANDING       Step ups 12\" fwd 2*10    8\" lateral 2*10 12\" fwd 2*10    8\" lateral 2*10 12\" fwd/lat 20* ea    Step Up High Kicks -- High kicks w/forward gait  50' x 4  2# MB     Resisted walking at FM 20# 5 laps ea dir  Next 27# 5* ea dir     Ladder (stairwell) -- 6 repetitions  (3)RLE lead  (3)LLE lead  Up/down 3 rungs, CGA for safety using gait belt  Single step pattern   -- 6 repetitions  (3)RLE lead  (3)LLE lead  Up/down 3 rungs, CGA for safety using gait belt  Single step pattern   Hip machine abduction/extension  25# 10* ea LE ea direction - smaller range with hip abduction on the R to avoid increased soreness in the L LE  Next 25# 10* ea dir ea LE     Sled push-pull Next  Next +55# 2 laps 60' ea direction    Squat - med ball touch to floor  Next  Next 10# 2*10    HSS, knee squeeze, HC stretch    1 x 10 knee squeeze  2 x 30\" hold HSS   2 x 30\" hold HC stretch   GAIT    Amb. 1222 ft in 6 min, no AD    Gait nearly normalized. VC for increasing LLE IR, heel strike. Slight right lateral shift w/swing phase LLE    Mild LLE fatigue    Stairs:  Independent, fast pace  11, no rail or AD, reciprocal,  No deficits other than slight left HC tightness. Jog/light run:  Independent  Normalized pattern  50 ft x 2                        PROPRIOCEPTION       BOSU lunge LLE  BOSU static stand black side  2'  (2) mild LOB requiring unilateral UE touch to rail. BOSU static balance black side 2' no LOB    Wobble board                             MODALITIES                         Other Therapeutic Activities/Education:    10/01/21:  DNT Acknowledgement form was provided, reviewed and signed. Pt notified that mild needle soreness x 1-2 days may be present and is an expected side effect. Mild bruising at needle site(s) may also occur. Patient wished to proceed. Wife present. Home Exercise Program:    See above. Handouts provided.     Manual Treatments:    NO    Modalities:    NO    Communication with other providers:   Discharge sent 21      Assessment:   End pain = 0/10; just hamstring tightness    Patient has been seen x 18 physical therapy sessions from 21 - 21  Diagnosis:   Diagnosis: L PATRICIO  21  Treatment Diagnosis: Treatment Diagnosis: L hip pain, weakness, dec ROM, gait dysfunction    HOOS: 0/20 (0%)  TU.8 sec, no UEs, no AD  6 Min walk: 1222 ft in 6 min, gait nearly normalized    All goals met. Pt expecting return to work soon, light duty. Reviewed HEP today, encouraged pt to continue. Discharge      Plan for Next Session: NA    Time In / Time Out:  1115/1158    Timed Code/Total Treatment Minutes: 43'/43'  TE 13' (1), GT 15' (1), TA 13' (1)      Next Progress Note due: Discharged.     Plan of Care Interventions:  [x] Therapeutic Exercise  [x] Modalities:  [x] Therapeutic Activity     [x] Ultrasound  [x] Estim  [x] Gait Training      [] Cervical Traction [] Lumbar Traction  [x] Neuromuscular Re-education    [x] Cold/hotpack [] Iontophoresis   [x] Instruction in HEP      [] Vasopneumatic   [x] Dry Needling    [x] Manual Therapy               [] Aquatic Therapy              Electronically signed by:  Vela Babinski, PT            2021,11:17 AM

## 2021-11-22 ENCOUNTER — OFFICE VISIT (OUTPATIENT)
Dept: ORTHOPEDIC SURGERY | Age: 56
End: 2021-11-22

## 2021-11-22 VITALS
WEIGHT: 206 LBS | HEIGHT: 71 IN | TEMPERATURE: 97.2 F | HEART RATE: 63 BPM | BODY MASS INDEX: 28.84 KG/M2 | OXYGEN SATURATION: 97 % | RESPIRATION RATE: 16 BRPM

## 2021-11-22 DIAGNOSIS — Z96.642 STATUS POST TOTAL REPLACEMENT OF LEFT HIP: Primary | ICD-10-CM

## 2021-11-22 PROCEDURE — 99024 POSTOP FOLLOW-UP VISIT: CPT | Performed by: ORTHOPAEDIC SURGERY

## 2021-11-22 ASSESSMENT — ENCOUNTER SYMPTOMS
COLOR CHANGE: 0
BACK PAIN: 0
CHEST TIGHTNESS: 0

## 2021-11-22 NOTE — PROGRESS NOTES
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 Pt, Nery Menezes is a 48 y.o. male returning to the office today following a left PATRICIO on 8/31/21. Pt states he is feeling good today. Pt declines any pain. He states he is still having trouble with ROM activities. He states he cant fully rotate his hip but has been attending PT and ROM has improved. He comes in today for his 3-month postop recheck. Overall he states that he is feeling great and is not having much pain in the left hip. Patient denies any new injury to the involved extremity/ joint, denies numbness or tingling in the involved extremity and denies fever or chills. He states that overall his limp is doing much better he is not having much pain in the left hip, he is now able to bend forward to tie his shoes but cannot cross his legs yet. He would like to discuss returning to work next week. Review of Systems   Constitutional: Negative for activity change, chills and fever. Respiratory: Negative for chest tightness. Cardiovascular: Negative for chest pain. Musculoskeletal: Negative for arthralgias, back pain, gait problem, joint swelling and myalgias. Skin: Negative for color change, pallor, rash and wound. Neurological: Negative for weakness and numbness. Past Medical History:   Diagnosis Date    Arthritis     Diabetes mellitus ([facility] [address]     Hyperlipidemia        Objective:   Physical Exam  Constitutional:       Appearance: He is well-developed. HENT:      Head: Normocephalic. Eyes:      Pupils: Pupils are equal, round, and reactive to light. Pulmonary:      Effort: Pulmonary effort is normal.   Musculoskeletal:         General: No swelling, tenderness or deformity. Normal range of motion. Cervical back: Normal range of motion. Right hip: No deformity, lacerations, tenderness, bony tenderness or crepitus. Normal strength.       Left hip: No deformity, lacerations, tenderness, bony tenderness or crepitus. Normal range of motion. Normal strength. Right knee: Normal.      Left knee: Normal.   Skin:     General: Skin is warm and dry. Capillary Refill: Capillary refill takes less than 2 seconds. Coloration: Skin is not pale. Findings: No erythema or rash. Neurological:      Mental Status: He is alert and oriented to person, place, and time. Left hip-Incision clean, dry, intact, with no erythema, no drainage, and no signs of infection. No groin pain with range of motion of the left hip. XR HIP 1 VW W PELVIS LEFT    Result Date: 11/22/2021  XRAY X-ray 2 views of the left hip and AP pelvis obtained and reviewed by me today in the office demonstrates age appropriate bone density throughout with well-positioned left total hip arthroplasty, there has been no change in position components compared to prior x-rays, no subluxation or dislocation noted, no acute osseous abnormalities. Impression: Stable left total hip arthroplasty with no acute process. Assessment:      Left PATRICIO, 3 months      Plan:      I discussed with him today his x-ray findings. I explained to him that his implants are stable and remain in good alignment. I discussed with him today that he is continuing to progress extremely well  Continue weight-bearing as tolerated. Continue range of motion exercises as instructed. Ice and elevate as needed. Tylenol or Motrin for pain. I will allow him to return to work on November 29 with no restrictions and he will gradually increase his activity level as tolerated over the next month. Follow up in 9 months for recheck with x-rays of the left hip.               Mariluz Vargas, DO

## 2021-11-22 NOTE — PROGRESS NOTES
Pt, Eliot Zavala is a 48 y.o. male returning to the office today following a left PATRICIO on 8/31/21. Pt states he is feeling good today. Pt declines any pain. He states he is still having trouble with ROM activities. He states he cant fully rotate his hip but has been attending PT and ROM has improved.

## 2021-11-22 NOTE — PATIENT INSTRUCTIONS
Weightbearing and activities as tolerated  May take Ibuprofen or Motrin as needed  Rest, ice, and elevate as needed  Work on ROM and strengthening exercises as discussed  Follow up in 9 months

## 2022-02-25 ENCOUNTER — OFFICE VISIT (OUTPATIENT)
Dept: ORTHOPEDIC SURGERY | Age: 57
End: 2022-02-25
Payer: COMMERCIAL

## 2022-02-25 VITALS
RESPIRATION RATE: 16 BRPM | WEIGHT: 206 LBS | BODY MASS INDEX: 28.84 KG/M2 | HEIGHT: 71 IN | OXYGEN SATURATION: 98 % | HEART RATE: 74 BPM

## 2022-02-25 DIAGNOSIS — M25.452 HIP SWELLING, LEFT: Primary | ICD-10-CM

## 2022-02-25 DIAGNOSIS — Z96.642 HX OF TOTAL HIP ARTHROPLASTY, LEFT: ICD-10-CM

## 2022-02-25 PROCEDURE — G8484 FLU IMMUNIZE NO ADMIN: HCPCS | Performed by: PHYSICIAN ASSISTANT

## 2022-02-25 PROCEDURE — G8419 CALC BMI OUT NRM PARAM NOF/U: HCPCS | Performed by: PHYSICIAN ASSISTANT

## 2022-02-25 PROCEDURE — G8427 DOCREV CUR MEDS BY ELIG CLIN: HCPCS | Performed by: PHYSICIAN ASSISTANT

## 2022-02-25 PROCEDURE — 1036F TOBACCO NON-USER: CPT | Performed by: PHYSICIAN ASSISTANT

## 2022-02-25 PROCEDURE — 3017F COLORECTAL CA SCREEN DOC REV: CPT | Performed by: PHYSICIAN ASSISTANT

## 2022-02-25 PROCEDURE — 99213 OFFICE O/P EST LOW 20 MIN: CPT | Performed by: PHYSICIAN ASSISTANT

## 2022-02-25 ASSESSMENT — ENCOUNTER SYMPTOMS
EYES NEGATIVE: 1
GASTROINTESTINAL NEGATIVE: 1
RESPIRATORY NEGATIVE: 1

## 2022-02-25 NOTE — PROGRESS NOTES
Review of Systems   Constitutional: Negative. HENT: Negative. Eyes: Negative. Respiratory: Negative. Cardiovascular: Negative. Gastrointestinal: Negative. Genitourinary: Negative. Musculoskeletal: Negative. Skin: Negative. Neurological: Negative. Psychiatric/Behavioral: Negative. Antonina Bautista is a 55 y.o. male that returns to the office today just to have his incision evaluated. He states that his wife was looking at his incision and felt that there was some fluid around the incision. He states that he does not have any pain and to him it feels fine and does not look bad at all. He does notice that there does appear to be some scar tissue around the incision and he thought that this would be better by now. Past Medical History:   Diagnosis Date    Arthritis     Diabetes mellitus ([address]     Hyperlipidemia        Past Surgical History:   Procedure Laterality Date    TOE SURGERY      1 screw placed in left middle toe    TOTAL HIP ARTHROPLASTY Left 8/31/2021    LEFT HIP TOTAL ARTHROPLASTY performed by Pamela Palmer DO at [facility]       No family history on file.     Social History     Socioeconomic History    Marital status:      Spouse name: None    Number of children: None    Years of education: None    Highest education level: None   Occupational History    None   Tobacco Use    Smoking status: Former Smoker     Packs/day: 0.50     Years: 30.00     Pack years: 15.00     Types: Cigarettes    Smokeless tobacco: Never Used   Vaping Use    Vaping Use: Never used   Substance and Sexual Activity    Alcohol use: Yes     Comment: occasionally/socially    Drug use: Never    Sexual activity: None   Other Topics Concern    None   Social History Narrative    None     Social Determinants of Health     Financial Resource Strain:     Difficulty of Paying Living Expenses: Not on file   Food Insecurity:     Worried About Running Out of Food in the Last Year: Not on file    Ran Out of Food in the Last Year: Not on file   Transportation Needs:     Lack of Transportation (Medical): Not on file    Lack of Transportation (Non-Medical): Not on file   Physical Activity:     Days of Exercise per Week: Not on file    Minutes of Exercise per Session: Not on file   Stress:     Feeling of Stress : Not on file   Social Connections:     Frequency of Communication with Friends and Family: Not on file    Frequency of Social Gatherings with Friends and Family: Not on file    Attends [employer] Services: Not on file    Active Member of [address] [place] [address] [employer] or Organizations: Not on file    Attends Club or Organization Meetings: Not on file    Marital Status: Not on file   Intimate Partner Violence:     Fear of Current or Ex-Partner: Not on file    Emotionally Abused: Not on file    Physically Abused: Not on file    Sexually Abused: Not on file   Housing Stability:     Unable to Pay for Housing in the Last Year: Not on file    Number of [place] in the Last Year: Not on file    Unstable Housing in the Last Year: Not on file       Current Outpatient Medications   Medication Sig Dispense Refill    metFORMIN (GLUCOPHAGE) 1000 MG tablet TAKE 1 TABLET BY MOUTH TWICE DAILY WITH MEALS      atorvastatin (LIPITOR) 20 MG tablet TAKE 1 TABLET BY MOUTH ONCE DAILY FOR 90 DAYS      ibuprofen (ADVIL;MOTRIN) 200 MG tablet Take 200 mg by mouth every 6 hours as needed for Pain       No current facility-administered medications for this visit. No Known Allergies    Review of Systems:  See above      Physical Exam:   Pulse 74   Resp 16   Ht 5' 11\" (1.803 m)   Wt 206 lb (93.4 kg)   SpO2 98%   BMI 28.73 kg/m²        Gait is Normal.       Gen/Psych:Examination reveals a pleasant individual in no acute distress. The patient is oriented to time, place and person. The patient's mood and affect are appropriate. Patient appears well nourished.  Body habitus is mildly overweight Lymph: No lymphedema in bilateral lower extremities     Skin intact in bilateral lower extremities with no ulcerations, lesions, rash, erythema. Vascular: There are no varicosities in bilateral lower extremities, sensation intact to light touch over bilateral lower extremities. Left hip:  Inspection: Incision is well-healed, scar tissue present on the posterior aspect of the incision with no obvious fluid collection felt. Palpation: No tenderness to palpation over the anterior lateral scar. Range of motion: No pain with internal and external rotation of the hip. Strength: 5/5 hip abduction, 5/5 hip adduction        Imaging studies:  AP pelvis and 1 view of the left hip taken and reviewed in the office today show left total hip arthroplasty in satisfactory position with no evidence of interval change compared to prior x-rays taken 3 months ago. Impression:     Diagnosis Orders   1. Hip swelling, left     2.  Hx of total hip arthroplasty, left           Plan:    Patient Instructions   The scar tissue on the side your hip is normal and as long as you are not experiencing any significant discomfort then just keep your follow-up visit as previously scheduled in August.  Call office with any concerns

## 2022-02-25 NOTE — PATIENT INSTRUCTIONS
The scar tissue on the side your hip is normal and as long as you are not experiencing any significant discomfort then just keep your follow-up visit as previously scheduled in August.  Call office with any concerns

## 2022-08-22 ENCOUNTER — OFFICE VISIT (OUTPATIENT)
Dept: ORTHOPEDIC SURGERY | Age: 57
End: 2022-08-22
Payer: COMMERCIAL

## 2022-08-22 VITALS
WEIGHT: 206 LBS | BODY MASS INDEX: 28.73 KG/M2 | HEART RATE: 80 BPM | DIASTOLIC BLOOD PRESSURE: 81 MMHG | SYSTOLIC BLOOD PRESSURE: 162 MMHG

## 2022-08-22 DIAGNOSIS — Z96.642 HX OF TOTAL HIP ARTHROPLASTY, LEFT: Primary | ICD-10-CM

## 2022-08-22 PROCEDURE — 1036F TOBACCO NON-USER: CPT | Performed by: ORTHOPAEDIC SURGERY

## 2022-08-22 PROCEDURE — 3017F COLORECTAL CA SCREEN DOC REV: CPT | Performed by: ORTHOPAEDIC SURGERY

## 2022-08-22 PROCEDURE — G8427 DOCREV CUR MEDS BY ELIG CLIN: HCPCS | Performed by: ORTHOPAEDIC SURGERY

## 2022-08-22 PROCEDURE — 99212 OFFICE O/P EST SF 10 MIN: CPT | Performed by: ORTHOPAEDIC SURGERY

## 2022-08-22 PROCEDURE — G8419 CALC BMI OUT NRM PARAM NOF/U: HCPCS | Performed by: ORTHOPAEDIC SURGERY

## 2022-08-22 RX ORDER — DULAGLUTIDE 0.75 MG/.5ML
INJECTION, SOLUTION SUBCUTANEOUS
COMMUNITY
Start: 2022-08-13

## 2022-08-22 ASSESSMENT — ENCOUNTER SYMPTOMS
COLOR CHANGE: 0
CHEST TIGHTNESS: 0
BACK PAIN: 0

## 2022-08-22 NOTE — PROGRESS NOTES
Subjective:      Patient ID: Gertrude Barber is a 55 y.o. male. Here for 1 year follow up left PATRICIO. No problems except when the weather changes. He comes in today for his 1 year postop recheck. Overall he states that he is feeling great and has not had any issues with his left hip since I saw him last.  Patient denies any new injury to the involved extremity/ joint, denies numbness or tingling in the involved extremity and denies fever or chills. Review of Systems   Constitutional:  Negative for activity change, chills and fever. Respiratory:  Negative for chest tightness. Cardiovascular:  Negative for chest pain. Musculoskeletal:  Negative for arthralgias, back pain, gait problem, joint swelling and myalgias. Skin:  Negative for color change, pallor, rash and wound. Neurological:  Negative for weakness and numbness. Past Medical History:   Diagnosis Date    Arthritis     Diabetes mellitus ([facility] [address]     Hyperlipidemia        Objective:   Physical Exam  Constitutional:       Appearance: He is well-developed. HENT:      Head: Normocephalic. Eyes:      Pupils: Pupils are equal, round, and reactive to light. Pulmonary:      Effort: Pulmonary effort is normal.   Musculoskeletal:         General: No swelling, tenderness or deformity. Normal range of motion. Cervical back: Normal range of motion. Right hip: No deformity, lacerations, tenderness, bony tenderness or crepitus. Normal strength. Left hip: No deformity, lacerations, tenderness, bony tenderness or crepitus. Normal range of motion. Normal strength. Right knee: Normal.      Left knee: Normal.   Skin:     General: Skin is warm and dry. Capillary Refill: Capillary refill takes less than 2 seconds. Coloration: Skin is not pale. Findings: No erythema or rash. Neurological:      Mental Status: He is alert and oriented to person, place, and time.    Left hip-Incision clean, dry, intact, with no erythema, no drainage, and no signs of infection. No groin pain with range of motion of the left hip. XR HIP 1 VW W PELVIS LEFT    Result Date: 8/22/2022  XRAY X-ray 3 views of the left hip and AP pelvis obtained and reviewed by me today in the office demonstrates age appropriate bone density throughout with well-positioned left total hip arthroplasty, there has been no change in position components compared to prior x-rays, no signs of loosening or wear, no subluxation or dislocation noted, no acute osseous abnormalities. Impression: Stable left total hip arthroplasty with no acute process                Assessment:      Left PATRICIO, 1 year      Plan:      I discussed with him today his x-ray findings. I explained to him that his implants are stable and remain in good alignment. I discussed with him today that he is continuing to progress extremely well  Continue weight-bearing as tolerated. Continue range of motion exercises as instructed. Ice and elevate as needed. Tylenol or Motrin for pain. Follow up in 5 years for recheck with x-rays of the left hip.               Mariluz 97, DO

## (undated) DEVICE — ELECTRODE ES AD CRDLSS PT RET REM POLYHESIVE

## (undated) DEVICE — SUTURE ABSORBABLE 3-0 PS-1 18 IN UD MONOCRYL + STRATAFIX SXMP1B102

## (undated) DEVICE — BIT DRL L30MM DIA3.2MM DISP FOR G7 2 MOBILITY CONSTRUCT

## (undated) DEVICE — SUTURE FIBERWIRE SZ 5 L38IN NONABSORBABLE BLU L48MM 1/2 AR7211

## (undated) DEVICE — GLOVE SURG SZ 8 L12IN THK75MIL DK GRN LTX FREE

## (undated) DEVICE — GLOVE ORANGE PI 7 1/2   MSG9075

## (undated) DEVICE — CHLORAPREP 26ML ORANGE

## (undated) DEVICE — TRAY EPI 25GA L3.5IN CONTAIN BPA DEHP PVC PENCAN

## (undated) DEVICE — TUBING, SUCTION, 9/32" X 10', STRAIGHT: Brand: MEDLINE

## (undated) DEVICE — COVER,TABLE,44X90,STERILE: Brand: MEDLINE

## (undated) DEVICE — TOWEL,OR,DSP,ST,BLUE,STD,6/PK,12PK/CS: Brand: MEDLINE

## (undated) DEVICE — FAN SPRAY KIT: Brand: PULSAVAC®

## (undated) DEVICE — RECIPROCATING BLADE, OFFSET (47.5 X 0.77 X 6.0MM)

## (undated) DEVICE — HOOD, PEEL-AWAY: Brand: FLYTE

## (undated) DEVICE — CSTM HIP POUCH PACK: Brand: MEDLINE INDUSTRIES, INC.

## (undated) DEVICE — SUTURE VCRL SZ 2-0 L18IN ABSRB UD CT-1 L36MM 1/2 CIR J839D

## (undated) DEVICE — SYSTEM SKIN CLSR 22CM DERMBND PRINEO

## (undated) DEVICE — SUTURE VCRL SZ 1 L27IN ABSRB UD L36MM CT-1 1/2 CIR JJ40G